# Patient Record
Sex: FEMALE | Race: WHITE | NOT HISPANIC OR LATINO | Employment: OTHER | URBAN - METROPOLITAN AREA
[De-identification: names, ages, dates, MRNs, and addresses within clinical notes are randomized per-mention and may not be internally consistent; named-entity substitution may affect disease eponyms.]

---

## 2017-05-18 ENCOUNTER — GENERIC CONVERSION - ENCOUNTER (OUTPATIENT)
Dept: OTHER | Facility: OTHER | Age: 67
End: 2017-05-18

## 2017-05-18 ENCOUNTER — ALLSCRIPTS OFFICE VISIT (OUTPATIENT)
Dept: OTHER | Facility: OTHER | Age: 67
End: 2017-05-18

## 2017-08-21 ENCOUNTER — ALLSCRIPTS OFFICE VISIT (OUTPATIENT)
Dept: OTHER | Facility: OTHER | Age: 67
End: 2017-08-21

## 2017-08-21 DIAGNOSIS — Z79.899 OTHER LONG TERM (CURRENT) DRUG THERAPY: ICD-10-CM

## 2017-08-21 DIAGNOSIS — R73.09 OTHER ABNORMAL GLUCOSE: ICD-10-CM

## 2017-08-21 DIAGNOSIS — Z78.0 ASYMPTOMATIC MENOPAUSAL STATE: ICD-10-CM

## 2017-08-21 DIAGNOSIS — K21.9 GASTRO-ESOPHAGEAL REFLUX DISEASE WITHOUT ESOPHAGITIS: ICD-10-CM

## 2017-08-21 DIAGNOSIS — Z00.00 ENCOUNTER FOR GENERAL ADULT MEDICAL EXAMINATION WITHOUT ABNORMAL FINDINGS: ICD-10-CM

## 2017-08-31 ENCOUNTER — HOSPITAL ENCOUNTER (OUTPATIENT)
Dept: BONE DENSITY | Facility: MEDICAL CENTER | Age: 67
Discharge: HOME | End: 2017-08-31
Payer: COMMERCIAL

## 2017-08-31 DIAGNOSIS — Z78.0 ASYMPTOMATIC MENOPAUSAL STATE: ICD-10-CM

## 2017-08-31 DIAGNOSIS — R73.09 OTHER ABNORMAL GLUCOSE: ICD-10-CM

## 2017-08-31 PROCEDURE — 77080 DXA BONE DENSITY AXIAL: CPT

## 2017-09-06 ENCOUNTER — APPOINTMENT (OUTPATIENT)
Dept: LAB | Facility: MEDICAL CENTER | Age: 67
End: 2017-09-06
Payer: COMMERCIAL

## 2017-09-06 ENCOUNTER — TRANSCRIBE ORDERS (OUTPATIENT)
Dept: ADMINISTRATIVE | Facility: HOSPITAL | Age: 67
End: 2017-09-06

## 2017-09-06 DIAGNOSIS — Z00.00 ENCOUNTER FOR GENERAL ADULT MEDICAL EXAMINATION WITHOUT ABNORMAL FINDINGS: ICD-10-CM

## 2017-09-06 DIAGNOSIS — Z79.899 OTHER LONG TERM (CURRENT) DRUG THERAPY: ICD-10-CM

## 2017-09-06 DIAGNOSIS — R73.09 OTHER ABNORMAL GLUCOSE: ICD-10-CM

## 2017-09-06 DIAGNOSIS — K21.9 GASTRO-ESOPHAGEAL REFLUX DISEASE WITHOUT ESOPHAGITIS: ICD-10-CM

## 2017-09-06 LAB
25(OH)D3 SERPL-MCNC: 33.7 NG/ML (ref 30–100)
ALBUMIN SERPL BCP-MCNC: 3.6 G/DL (ref 3.5–5)
ALP SERPL-CCNC: 57 U/L (ref 46–116)
ALT SERPL W P-5'-P-CCNC: 19 U/L (ref 12–78)
ANION GAP SERPL CALCULATED.3IONS-SCNC: 7 MMOL/L (ref 4–13)
AST SERPL W P-5'-P-CCNC: 12 U/L (ref 5–45)
BILIRUB SERPL-MCNC: 1.28 MG/DL (ref 0.2–1)
BUN SERPL-MCNC: 17 MG/DL (ref 5–25)
CALCIUM SERPL-MCNC: 8.9 MG/DL (ref 8.3–10.1)
CHLORIDE SERPL-SCNC: 105 MMOL/L (ref 100–108)
CHOLEST SERPL-MCNC: 172 MG/DL (ref 50–200)
CO2 SERPL-SCNC: 27 MMOL/L (ref 21–32)
CREAT SERPL-MCNC: 0.72 MG/DL (ref 0.6–1.3)
EST. AVERAGE GLUCOSE BLD GHB EST-MCNC: 103 MG/DL
GFR SERPL CREATININE-BSD FRML MDRD: 86 ML/MIN/1.73SQ M
GLUCOSE P FAST SERPL-MCNC: 86 MG/DL (ref 65–99)
HBA1C MFR BLD: 5.2 % (ref 4.2–6.3)
HCV AB SER QL: NORMAL
HDLC SERPL-MCNC: 65 MG/DL (ref 40–60)
LDLC SERPL CALC-MCNC: 92 MG/DL (ref 0–100)
MAGNESIUM SERPL-MCNC: 2.1 MG/DL (ref 1.6–2.6)
POTASSIUM SERPL-SCNC: 4.2 MMOL/L (ref 3.5–5.3)
PROT SERPL-MCNC: 7.2 G/DL (ref 6.4–8.2)
SODIUM SERPL-SCNC: 139 MMOL/L (ref 136–145)
TRIGL SERPL-MCNC: 75 MG/DL

## 2017-09-06 PROCEDURE — 83735 ASSAY OF MAGNESIUM: CPT

## 2017-09-06 PROCEDURE — 82306 VITAMIN D 25 HYDROXY: CPT

## 2017-09-06 PROCEDURE — 36415 COLL VENOUS BLD VENIPUNCTURE: CPT

## 2017-09-06 PROCEDURE — 80053 COMPREHEN METABOLIC PANEL: CPT

## 2017-09-06 PROCEDURE — 86803 HEPATITIS C AB TEST: CPT

## 2017-09-06 PROCEDURE — 83036 HEMOGLOBIN GLYCOSYLATED A1C: CPT

## 2017-09-06 PROCEDURE — 80061 LIPID PANEL: CPT

## 2017-09-07 ENCOUNTER — GENERIC CONVERSION - ENCOUNTER (OUTPATIENT)
Dept: OTHER | Facility: OTHER | Age: 67
End: 2017-09-07

## 2017-09-08 ENCOUNTER — GENERIC CONVERSION - ENCOUNTER (OUTPATIENT)
Dept: OTHER | Facility: OTHER | Age: 67
End: 2017-09-08

## 2017-09-11 ENCOUNTER — GENERIC CONVERSION - ENCOUNTER (OUTPATIENT)
Dept: OTHER | Facility: OTHER | Age: 67
End: 2017-09-11

## 2017-09-14 ENCOUNTER — GENERIC CONVERSION - ENCOUNTER (OUTPATIENT)
Dept: OTHER | Facility: OTHER | Age: 67
End: 2017-09-14

## 2017-10-05 ENCOUNTER — GENERIC CONVERSION - ENCOUNTER (OUTPATIENT)
Dept: OTHER | Facility: OTHER | Age: 67
End: 2017-10-05

## 2017-10-07 ENCOUNTER — TRANSCRIBE ORDERS (OUTPATIENT)
Dept: ADMINISTRATIVE | Facility: HOSPITAL | Age: 67
End: 2017-10-07

## 2017-10-07 ENCOUNTER — APPOINTMENT (OUTPATIENT)
Dept: LAB | Facility: MEDICAL CENTER | Age: 67
End: 2017-10-07
Payer: COMMERCIAL

## 2017-10-07 DIAGNOSIS — R17 JAUNDICE: ICD-10-CM

## 2017-10-07 DIAGNOSIS — M15.9 GENERALIZED OSTEOARTHROSIS, INVOLVING MULTIPLE SITES: Primary | ICD-10-CM

## 2017-10-07 DIAGNOSIS — M15.9 GENERALIZED OSTEOARTHROSIS, INVOLVING MULTIPLE SITES: ICD-10-CM

## 2017-10-07 LAB
ALBUMIN SERPL BCP-MCNC: 3.7 G/DL (ref 3.5–5)
ALP SERPL-CCNC: 54 U/L (ref 46–116)
ALT SERPL W P-5'-P-CCNC: 21 U/L (ref 12–78)
AST SERPL W P-5'-P-CCNC: 10 U/L (ref 5–45)
BILIRUB DIRECT SERPL-MCNC: 0.26 MG/DL (ref 0–0.2)
BILIRUB SERPL-MCNC: 1.14 MG/DL (ref 0.2–1)
CALCIUM SERPL-MCNC: 8 MG/DL (ref 8.3–10.1)
PROT SERPL-MCNC: 7.1 G/DL (ref 6.4–8.2)
PTH-INTACT SERPL-MCNC: 50.1 PG/ML (ref 14–72)

## 2017-10-07 PROCEDURE — 82310 ASSAY OF CALCIUM: CPT

## 2017-10-07 PROCEDURE — 80076 HEPATIC FUNCTION PANEL: CPT

## 2017-10-07 PROCEDURE — 36415 COLL VENOUS BLD VENIPUNCTURE: CPT

## 2017-10-07 PROCEDURE — 83970 ASSAY OF PARATHORMONE: CPT

## 2017-10-09 ENCOUNTER — GENERIC CONVERSION - ENCOUNTER (OUTPATIENT)
Dept: OTHER | Facility: OTHER | Age: 67
End: 2017-10-09

## 2017-10-09 ENCOUNTER — ALLSCRIPTS OFFICE VISIT (OUTPATIENT)
Dept: OTHER | Facility: OTHER | Age: 67
End: 2017-10-09

## 2017-10-28 NOTE — PROGRESS NOTES
Assessment    1  Osteoporosis (733 00) (M81 0)    Plan   Osteoporosis    · (1) COMPREHENSIVE METABOLIC PANEL; Status:Active; Requested for:09Apr2018;   Prolia 60 MG/ML Subcutaneous Solution; INJECT SUBCUTANEOUSLY  60 MG / 1 ML EVERY 6 MONTHS; Therapy: [id number] to 23 [id number])  Requested for: [id number]; Last Rx:09Oct2017; Status: ACTIVE Ordered Rx By: Manohar Pelletier; Dispense: 180 Days ; #:1 X 1 ML Syringe; Refill: 1;  For: Osteoporosis; VANNA = N; Verified Transmission to [facility]; Last Updated By: System, Asthmatracker; 10/9/2017 4:23:35 PM   Discussion/Summary    1  Osteoporosis -of the hip and forearm -the patient is DEXA scan results were reviewed  We reviewed treatment options including Prolia and Reclast as recommended per her report  She opted to proceed with Prolia  She declined a referral to Endocrinology  She was encouraged to start a once daily calcium supplement  We reviewed that she is likely getting about 600 mg of calcium with her current very intake and should get an additional 600 mg through supplementation to meet the 1200 mg goal  She is already taking a vitamin-D supplement which she should continue  She was given a slip for CMP to recheck her calcium level we reviewed the importance of maintaining a regular calcium level on the Prolia  We also reviewed the importance of continuing her regular dental exams  She was encouraged to call with any problems or concerns  Otherwise she will follow-up as already scheduled in just under a year  Possible side effects of new medications were reviewed with the patient/guardian today  The treatment plan was reviewed with the patient/guardian  The patient/guardian understands and agrees with the treatment plan      Chief Complaint  Follow up on labs, DEXAwith flu shot      History of Present Illness  The patient presents today to follow-up on her recent DEXA scan results   It showed that she has osteoporosis in both the hip and forearm areas  She has osteopenia in lumbar spine  Her labs showed borderline low calcium level after being corrected for albumin level  She had a normal PTH and vitamin-D level  She notes that she gets usually at least 2 servings of dairy in a day between her cheese and yogurt  She also takes about vitamin-D supplement daily  She is interested in treatment for the osteoporosis  Review of Systems   Constitutional: not feeling poorly  Cardiovascular: no chest pain  Respiratory: no shortness of breath  Active Problems  1  Abnormal glucose level (790 29) (R73 09)   2  Elevated bilirubin (277 4) (R17)   3  Encounter for long-term (current) use of medications (V58 69) (Z79 899)   4  Encounter for screening colonoscopy (V76 51) (Z12 11)   5  Esophageal reflux (530 81) (K21 9)   6  Generalized osteoarthritis (715 00) (M15 9)   7  Influenza vaccine needed (V04 81) (Z23)   8  Joint pain, knee (719 46) (M25 569)   9  Need for tetanus booster (V03 7) (Z23)   10  Postmenopausal (V49 81) (Z78 0)    Past Medical History  1  Encounter for preventive health examination (V70 0) (Z00 00)   2  History of hypertension (V12 59) (Z86 79)   3  History of Tick bite (919 4,E906 4) (W57 XXXA)    Surgical History  1  History of Biopsy Skin   2  History of Excision Of Urethral Polyp(S)    Family History  Mother    1  Family history of Stroke Syndrome (V17 1)  Father    2  Family history of Liver Cancer   3  Family history of Prostate Cancer (V16 42)  Sister    4  Family history of Rheumatoid Arthritis  Brother    5  Family history of Diabetes Mellitus (V18 0)  Paternal Grandmother    10  Family history of malignant neoplasm of stomach (V16 0) (Z80 0)  Maternal Grandfather    7  Family history of lung cancer (V16 1) (Z80 1)  Paternal Grandfather    6  Family history of malignant neoplasm (V16 9) (Z80 9)    Social History     · Denied: History of Never a smoker    Current Meds   1   NexIUM 40 MG Oral Capsule Delayed Release; TAKE 1 CAPSULE DAILY; Therapy: (Tima Santiago) to Recorded   2  Omega-3 CF 1000 MG Oral Capsule; Take 2 tablets by mouth twice daily; Therapy: (Mireille Villalobos) to Recorded   3  Vitamin D3 1000 UNIT Oral Tablet; Therapy: (Recorded:80Sgb6009) to Recorded    Allergies  1  No Known Drug Allergies    Vitals  Vital Signs    Recorded: 01RTT9499 10:17AM   Heart Rate 76   Respiration 18   Systolic 701   Diastolic 70   Height 5 ft 2 3 in   Weight 150 lb    BMI Calculated 27 17   BSA Calculated 1 7       Physical Exam   Constitutional  General appearance: No acute distress, well appearing and well nourished  Neck  Neck: Supple, symmetric, trachea midline, no masses  Thyroid: Normal, no thyromegaly  Pulmonary  Respiratory effort: No increased work of breathing or signs of respiratory distress  Auscultation of lungs: Clear to auscultation  Cardiovascular  Auscultation of heart: Normal rate and rhythm, normal S1 and S2, no murmurs  Peripheral vascular exam: Normal   Carotid: no bruit on the right-- and-- no bruit on the left  Radial: right 2+-- and-- left 2+  Posterior tibialis: right 2+-- and-- left 2+  Examination of extremities for edema and/or varicosities: Normal   no edema    Psychiatric  Mood and affect: Normal        Future Appointments    Date/Time Provider Specialty Site   08/21/2018 10:00 AM Margaux Plummer [facility] Family Medicine [address] FAMILY MEDICINE       Signatures   Electronically signed by : Zafar Lindsey [facility]; Oct 10 2017 12:22AM EST                       (Author)    Electronically signed by : ALVERTO Le ; Oct 10 2017 11:46AM EST

## 2017-11-14 ENCOUNTER — TRANSCRIBE ORDERS (OUTPATIENT)
Dept: ADMINISTRATIVE | Facility: HOSPITAL | Age: 67
End: 2017-11-14

## 2017-11-14 DIAGNOSIS — Z12.39 SCREENING BREAST EXAMINATION: Primary | ICD-10-CM

## 2018-01-10 NOTE — RESULT NOTES
Verified Results  (1) COMPREHENSIVE METABOLIC PANEL [id number] 13:95SA [employer]    Order Number: [id number]     Test Name Result Flag Reference   SODIUM 139 mmol/L  136-145   POTASSIUM 4 2 mmol/L  3 5-5 3   CHLORIDE 105 mmol/L  100-108   CARBON DIOXIDE 27 mmol/L  21-32   ANION GAP (CALC) 7 mmol/L  4-13   BLOOD UREA NITROGEN 17 mg/dL  5-25   CREATININE 0 72 mg/dL  0 60-1 30   Standardized to IDMS reference method   CALCIUM 8 9 mg/dL  8 3-10 1   BILI, TOTAL 1 28 mg/dL H 0 20-1 00   ALK PHOSPHATAS 57 U/L     ALT (SGPT) 19 U/L  12-78   Specimen collection should occur prior to Sulfasalazine and/or Sulfapyridine administration due to the potential for falsely depressed results  AST(SGOT) 12 U/L  5-45   Specimen collection should occur prior to Sulfasalazine administration due to the potential for falsely depressed results  ALBUMIN 3 6 g/dL  3 5-5 0   TOTAL PROTEIN 7 2 g/dL  6 4-8 2   eGFR 86 ml/min/1 73sq m     National Kidney Disease Education Program recommendations are as follows:  GFR calculation is accurate only with a steady state creatinine  Chronic Kidney disease less than 60 ml/min/1 73 sq  meters  Kidney failure less than 15 ml/min/1 73 sq  meters  GLUCOSE FASTING 86 mg/dL  65-99   Specimen collection should occur prior to Sulfasalazine administration due to the potential for falsely depressed results  Specimen collection should occur prior to Sulfapyridine administration due to the potential for falsely elevated results  (1) HEMOGLOBIN A1C [id number] 09:29AM [employer]    Order Number: [id number]     Test Name Result Flag Reference   HEMOGLOBIN A1C 5 2 %  4 2-6 3   EST  AVG   GLUCOSE 103 mg/dl       (1) LIPID PANEL FASTING W DIRECT LDL REFLEX [id number] 09:29AM [employer]    Order Number: [id number]     Test Name Result Flag Reference   CHOLESTEROL 172 mg/dL     LDL CHOLESTEROL CALCULATED 92 mg/dL  0-100   Triglyceride:        Normal ??? ??? ??? ??? ??? ??? ??? <150 mg/dl   ??? ??? ???Borderline High ??? ??? 150-199 mg/dl   ??? ??? ? ?? High ??? ??? ??? ??? ??? ??? ??? 200-499 mg/dl   ??? ??? ? ??Very High ??? ??? ??? ??? ??? >499 mg/dl      Cholesterol:       Desirable ??? ??? ??? ??? <200 mg/dl   ??? ??? Borderline High ??? 200-239 mg/dl   ??? ??? High ??? ??? ??? ??? ??? ??? >239 mg/dl      HDL Cholesterol:       High ??? ???>59 mg/dL   ??? ??? Low ??? ??? <41 mg/dL      HDL Cholesterol:       High ??? ???>59 mg/dL   ??? ??? Low ??? ??? <41 mg/dL      This screening LDL is a calculated result  It does not have the accuracy of the Direct Measured LDL in the monitoring of patients with hyperlipidemia and/or statin therapy  Direct Measure LDL (CKM175) must be ordered separately in these patients  TRIGLYCERIDES 75 mg/dL  <=150   Specimen collection should occur prior to N-Acetylcysteine or Metamizole administration due to the potential for falsely depressed results  HDL,DIRECT 65 mg/dL H 40-60   Specimen collection should occur prior to Metamizole administration due to the potential for falsley depressed results  (1) MAGNESIUM [id number] 09:29AM Walter Clear   TW Order Number: [id number]     Test Name Result Flag Reference   MAGNESIUM 2 1 mg/dL  1 6-2 6     (1) HEP C ANTIBODY [id number] 09:29AM Walter Clear   TW Order Number: [id number]     Test Name Result Flag Reference   HEPATITIS C ANTIBODY Non-reactive  Non-reactive     (1) VITAMIN D 25-HYDROXY [id number] 09:29AM Walter Clear   TW Order Number: [id number]     Test Name Result Flag Reference   VIT D 25-HYDROX 33 7 ng/mL  30 0-100 0   This assay is a certified procedure of the CDC Vitamin D Standardization Certification Program (VDSCP)     Deficiency <20ng/ml   Insufficiency 20-30ng/ml   Sufficient  ng/ml     *Patients undergoing fluorescein dye angiography may retain small amounts of fluorescein in the body for 48-72 hours post procedure   Samples containing fluorescein can produce falsely elevated Vitamin D values  If the patient had this procedure, a specimen should be resubmitted post fluorescein clearance

## 2018-01-10 NOTE — RESULT NOTES
Verified Results  (1) CALCIUM [id number] 03:12PM Jeni Leggett     Test Name Result Flag Reference   CALCIUM 8 0 mg/dL L 8 3-10 1     (1) PTH N-TERMINAL (INTACT) [id number] 03:12PM Jeni Leggett     Test Name Result Flag Reference   PARATHYROID HORMONE INTACT 50 1 pg/mL  14 0-72 0     (1) HEPATIC FUNCTION PANEL [id number] 08:22AM Jeni Leggett   TW Order Number: [id number]     Test Name Result Flag Reference   ALBUMIN 3 7 g/dL  3 5-5 0   ALK PHOSPHATAS 54 U/L     ALT (SGPT) 21 U/L  12-78   Specimen collection should occur prior to Sulfasalazine and/or Sulfapyridine administration due to the potential for falsely depressed results  AST(SGOT) 10 U/L  5-45   Specimen collection should occur prior to Sulfasalazine and/or Sulfapyridine administration due to the potential for falsely depressed results     BILI, DIRECT 0 26 mg/dL H 0 00-0 20   BILI, TOTAL 1 14 mg/dL H 0 20-1 00   TOTAL PROTEIN 7 1 g/dL  6 4-8 2

## 2018-01-11 NOTE — PROGRESS NOTES
Assessment    1  Encounter for preventive health examination (V70 0) (Z00 00)   2  Esophageal reflux (530 81) (K21 9)   3  Abnormal glucose level (790 29) (R73 09)   4  Need for pneumococcal vaccination (V03 82) (Z23)    Plan  Abnormal glucose level    · (1) LIPID PANEL FASTING W DIRECT LDL REFLEX; Status:Active; Requested  for:59Aug2666; Abnormal glucose level, Health Maintenance    · (1) HEMOGLOBIN A1C; Status:Active; Requested for:21Aug2017; Abnormal glucose level, Postmenopausal    · * DXA BONE DENSITY SPINE HIP AND PELVIS; Status:Hold For - Scheduling;  Requested for:21Aug2017;   Encounter for long-term (current) use of medications, Health Maintenance    · (1) VITAMIN D 25-HYDROXY; Status:Active; Requested for:21Aug2017; Health Maintenance    · (1) COMPREHENSIVE METABOLIC PANEL; Status:Active; Requested for:21Aug2017;    · (1) HEP C ANTIBODY; Status:Active; Requested for:21Aug2017; Health Maintenance, Esophageal reflux    · (1) MAGNESIUM; Status:Active; Requested for:21Aug2017; Influenza vaccine needed    · Fluzone High-Dose 0 5 ML Intramuscular Suspension Prefilled Syringe  Need for pneumococcal vaccination    · Prevnar 13 Intramuscular Suspension    Discussion/Summary  health maintenance visit healthy adult female Currently, she eats a healthy diet and has an adequate exercise regimen  cervical cancer screening is current Breast cancer screening: mammogram is current  Colorectal cancer screening: colorectal cancer screening is current and the next colonoscopy is due 2023  Osteoporosis screening: bone mineral density testing has been ordered  Screening lab work includes labs ordered as above  The immunizations will be given as outlined in the orders  Patient discussion: discussed with the patient  Hepatitis C Screening: the patient was counseled on Hepatitis C screening  The patient agrees to Hepatitis C screening  GERD - well-controlled - we discussed possible side effects of PPIs   She'll continue with the Nexium 40 mg daily for now, but plan to discuss possible decrease in dose when she returns to follow-up with GI  We will check a magnesium level with labs as above  Abnormal fasting blood sugar - we reviewed that her last blood work from 2014 showed a minimally elevated blood sugar 101 - this will be reassessed with labs as above  Chief Complaint  Physical       History of Present Illness  HM, Adult Female: The patient is being seen for a health maintenance evaluation  General Health: The patient's health since the last visit is described as good   has arthritis in knee and back but helped with glycomarine  She has regular dental visits  She complains of vision problems  Vision care includes wearing reading glasses and having eye examinations 1 times per year  She denies hearing loss  Lifestyle:  She consumes a diverse and healthy diet  (eating more whole foods - drinks mostly seltzer water - occ flavored tea - eats out only once a week)   She has weight concerns  (has lost 25 pounds since retired 4 years ago)   She exercises regularly  She exercises 5 times per week for 20-25 minutes per session  Exercise includes walking  She does not use tobacco  She denies alcohol use  She denies drug use  Reproductive health: the patient is postmenopausal    Screening: Cervical cancer screening includes goes yearly but last Pap was probably 12/15  Breast cancer screening includes a mammogram performed last year  Colorectal cancer screening includes a colonoscopy performed 2013  Metabolic screening includes lipid profile performed 2014, glucose screening performed 2014 and no previous DEXA  Safety elements used: seat belt, sunscreen, smoke detector and carbon monoxide detector  Risk findings: no domestic violence  HPI: The patient takes Nexium on a daily basis for her acid reflux  She does follow with GI   She'll be seeing them next spring she notes that it works well to control her symptoms  There is a time a few years ago when she needed to try some other options due to insurance coverage but they did not work  She did well once she was placed back on the Nexium  Review of Systems    Constitutional: no fever and no chills  Cardiovascular: no chest pain and no palpitations  Respiratory: no shortness of breath, no cough and no wheezing  Gastrointestinal: no nausea, no vomiting, no diarrhea and no blood in stools  Genitourinary: no dysuria  Musculoskeletal: no myalgias    The patient presents with complaints of arthralgias (knees and back)  Integumentary: no rashes and no skin lesions  Neurological: no headache, no dizziness and no fainting  Psychiatric: no anxiety and no depression  Hematologic/Lymphatic: no tendency for easy bleeding and no tendency for easy bruising  Active Problems    1  Encounter for screening colonoscopy (V76 51) (Z12 11)   2  Esophageal reflux (530 81) (K21 9)   3  Generalized osteoarthritis (715 00) (M15 9)   4  Influenza vaccine needed (V04 81) (Z23)   5  Joint pain, knee (719 46) (M25 569)   6  Need for tetanus booster (V03 7) (Z23)    Past Medical History    · History of hypertension (V12 59) (Z86 79)   · History of Tick bite (919 4,E906 4) (W57 XXXA)    Surgical History    · History of Biopsy Skin   · History of Excision Of Urethral Polyp(S)    Family History  Mother    · Family history of Stroke Syndrome (V17 1)  Father    · Family history of Liver Cancer   · Family history of Prostate Cancer (V16 42)  Sister    · Family history of Rheumatoid Arthritis  Brother    · Family history of Diabetes Mellitus (V18 0)  Paternal Grandmother    · Family history of malignant neoplasm of stomach (V16 0) (Z80 0)  Maternal Grandfather    · Family history of lung cancer (V16 1) (Z80 1)  Paternal Grandfather    · Family history of malignant neoplasm (V16 9) (Z80 9)    Social History    · Denied: History of Never a smoker    Current Meds   1   NexIUM 40 MG Oral Capsule Delayed Release; TAKE 1 CAPSULE DAILY; Therapy: (Shaun Sue) to Recorded   2  Omega-3 CF 1000 MG Oral Capsule; Take 2 tablets by mouth twice daily; Therapy: (Arvella Solsamina) to Recorded   3  Vitamin D3 1000 UNIT Oral Tablet; Therapy: (Recorded:21Aug2017) to Recorded    Allergies    1  No Known Drug Allergies    Vitals   Recorded: 21Aug2017 11:02AM   Heart Rate 78   Systolic 126   Diastolic 70   Height 5 ft 2 3 in   Weight 147 lb 6 oz   BMI Calculated 26 7   BSA Calculated 1 68     Physical Exam    Constitutional   General appearance: No acute distress, well appearing and well nourished  Head and Face   Head and face: Normal     Eyes   Conjunctiva and lids: No swelling, erythema or discharge  Pupils and irises: Equal, round, reactive to light  Ears, Nose, Mouth, and Throat   External inspection of ears and nose: Normal     Otoscopic examination: Tympanic membranes translucent with normal light reflex  Canals patent without erythema  Hearing: Normal     Nasal mucosa, septum, and turbinates: Normal without edema or erythema  Lips, teeth, and gums: Normal, good dentition  Oropharynx: Normal with no erythema, edema, exudate or lesions  Neck   Neck: Supple, symmetric, trachea midline, no masses  Thyroid: Normal, no thyromegaly  Pulmonary   Respiratory effort: No increased work of breathing or signs of respiratory distress  Auscultation of lungs: Clear to auscultation  Cardiovascular   Auscultation of heart: Normal rate and rhythm, normal S1 and S2, no murmurs  Peripheral vascular exam: Normal   Carotid: no bruit on the right and no bruit on the left  Radial: right 2+ and left 2+  Posterior tibialis: right 2+ and left 2+  Examination of extremities for edema and/or varicosities: Normal   no edema  Abdomen   Abdomen: Non-tender, no masses  Liver and spleen: No hepatomegaly or splenomegaly     Lymphatic   Palpation of lymph nodes in neck: No lymphadenopathy  Musculoskeletal   Gait and station: Normal     Muscle strength/tone: Normal   Motor Strength Findings: normal upper extremity strength and normal lower extremity strength  Skin   Skin and subcutaneous tissue: Normal without rashes or lesions  Examination of the skin for lesions: Abnormal   Small pink macules over the right flank where she had a tick bite previously, no erythema/warmth/swelling/fluctuance noted  Neurologic   Sensation: No sensory loss  Sensory exam: intact to light touch  Psychiatric   Mood and affect: Normal        Future Appointments    Date/Time Provider Specialty Site   08/21/2018 10:00 AM Danuta Jacome [facility] Family Medicine [address] [facility] MEDICINE     Signatures   Electronically signed by : Denis Bob [facility];  Aug 21 2017  1:00PM EST                       (Author)    Electronically signed by : ALVERTO Thurston ; Aug 22 2017  9:48AM EST

## 2018-01-12 NOTE — RESULT NOTES
Verified Results  DXA FOLLOW UP (NO CHARGE) [id number] 09:37AM Savi Gauthier Order Number: [id number]    - Patient Instructions: To schedule this appointment, please contact Central Scheduling at [phone]  Test Name Result Flag Reference   DXA FOLLOW UP (NO CHARGE) (Report)     CENTRAL DXA SCAN     CLINICAL HISTORY:  75year old post-menopausal  female risk factors include estrogen deficiency  TECHNIQUE: Bone densitometry was performed using a Ajaline's W bone densitometer  Regions of interest appear properly placed  There are no obvious fractures or other confounding variables which could limit the study  Degenerative and    osteoarthritic and scoliotic changes are noted on the spine image basically eliminating the validity of the spine density result  COMPARISON: Baseline     RESULTS:    LUMBAR SPINE: L1-L2:   BMD 0 868 gm/cm2   T-score below normal, -1 0   Z-score 0 9     LEFT TOTAL HIP:   BMD 0 634 gm/cm2   T-score below normal, -2 5, osteoporosis  Z-score -1 1     LEFT FEMORAL NECK:   BMD 0 541 gm/cm2   T-score below normal, -2 8, osteoporosis  Z-score -1 1     The forearm BMD is 0 537 and the T score is below normal, -2 6, osteoporosis  The Z score is -0 7  A 25-hydroxy vitamin D level, an intact PTH, and a comprehensive metabolic panel are suggested in view of the study results  Treatment is a consideration and might include Prolia or intravenous Reclast        IMPRESSION:   1  Based on the [facility] classification, this study identifies a diagnosis of osteoporosis, notable at the forearm and femoral neck areas and the patient is considered at elevated risk for fracture  2  A daily intake of calcium of at least 1200 mg and vitamin D, 800-1000 IU, as well as weight bearing and muscle strengthening exercise, fall prevention and avoidance of tobacco and excessive alcohol intake as basic preventive measures are recommended       3  Repeat DXA scan on the same equipment in 18-24 months as clinically indicated  The 10 year risk of hip fracture is 3 7%, with the 10 year risk of major osteoporotic fracture being 15%, as calculated by the [facility] fracture risk assessment tool (FRAX)  The current NOF guidelines recommend treating patients with FRAX 10 year risk score    of >3% for hip fracture and >20% for major osteoporotic fracture  Hip fracture risk exceeds treatment thresholds  WHO CLASSIFICATION:   Normal (a T-score of -1 0 or higher)   Low bone mineral density (a T-score of less than -1 0 but higher than -2 5)   Osteoporosis (a T-score of -2 5 or less)   Severe osteoporosis (a T-score of -2 5 or less with a fragility fracture)      Thank you for allowing us the opportunity to participate in your patient care  The expanded DEXA report will no longer be arriving in your mail  If you desire to view the full report please contact [address] [place] [address] or access the PACS system         Workstation performed: [id number]     Signed by:   Mya Coleman MD   9/11/17

## 2018-01-12 NOTE — RESULT NOTES
Verified Results  DXA FOLLOW UP (NO CHARGE) [id number] 09:37AM Kevin Stapleton Order Number: [id number]    - Patient Instructions: To schedule this appointment, please contact Central Scheduling at [phone]  Test Name Result Flag Reference   DXA FOLLOW UP (NO CHARGE) (Report)     CENTRAL DXA SCAN     CLINICAL HISTORY:  75year old post-menopausal  female risk factors include estrogen deficiency  TECHNIQUE: Bone densitometry was performed using a [employer] W bone densitometer  Regions of interest appear properly placed  There are no obvious fractures or other confounding variables which could limit the study  Degenerative and    osteoarthritic and scoliotic changes are noted on the spine image basically eliminating the validity of the spine density result  COMPARISON: Baseline     RESULTS:    LUMBAR SPINE: L1-L2:   BMD 0 868 gm/cm2   T-score below normal, -1 0   Z-score 0 9     LEFT TOTAL HIP:   BMD 0 634 gm/cm2   T-score below normal, -2 5, osteoporosis  Z-score -1 1     LEFT FEMORAL NECK:   BMD 0 541 gm/cm2   T-score below normal, -2 8, osteoporosis  Z-score -1 1     The forearm BMD is 0 537 and the T score is below normal, -2 6, osteoporosis  The Z score is -0 7  A 25-hydroxy vitamin D level, an intact PTH, and a comprehensive metabolic panel are suggested in view of the study results  Treatment is a consideration and might include Prolia or intravenous Reclast        IMPRESSION:   1  Based on the [facility] classification, this study identifies a diagnosis of osteoporosis, notable at the forearm and femoral neck areas and the patient is considered at elevated risk for fracture  2  A daily intake of calcium of at least 1200 mg and vitamin D, 800-1000 IU, as well as weight bearing and muscle strengthening exercise, fall prevention and avoidance of tobacco and excessive alcohol intake as basic preventive measures are recommended       3  Repeat DXA scan on the same equipment in 18-24 months as clinically indicated  The 10 year risk of hip fracture is 3 7%, with the 10 year risk of major osteoporotic fracture being 15%, as calculated by the [facility] fracture risk assessment tool (FRAX)  The current NOF guidelines recommend treating patients with FRAX 10 year risk score    of >3% for hip fracture and >20% for major osteoporotic fracture  Hip fracture risk exceeds treatment thresholds  WHO CLASSIFICATION:   Normal (a T-score of -1 0 or higher)   Low bone mineral density (a T-score of less than -1 0 but higher than -2 5)   Osteoporosis (a T-score of -2 5 or less)   Severe osteoporosis (a T-score of -2 5 or less with a fragility fracture)      Thank you for allowing us the opportunity to participate in your patient care  The expanded DEXA report will no longer be arriving in your mail  If you desire to view the full report please contact [address] [place] [address] or access the PACS system         Workstation performed: [id number]     Signed by:   Fiorella Davalos MD   9/11/17

## 2018-01-13 VITALS
OXYGEN SATURATION: 98 % | HEIGHT: 62 IN | BODY MASS INDEX: 28.06 KG/M2 | SYSTOLIC BLOOD PRESSURE: 110 MMHG | HEART RATE: 74 BPM | RESPIRATION RATE: 18 BRPM | WEIGHT: 152.5 LBS | DIASTOLIC BLOOD PRESSURE: 70 MMHG

## 2018-01-13 VITALS
BODY MASS INDEX: 27.6 KG/M2 | WEIGHT: 150 LBS | SYSTOLIC BLOOD PRESSURE: 108 MMHG | HEART RATE: 76 BPM | HEIGHT: 62 IN | DIASTOLIC BLOOD PRESSURE: 70 MMHG | RESPIRATION RATE: 18 BRPM

## 2018-01-13 VITALS
HEIGHT: 62 IN | HEART RATE: 78 BPM | SYSTOLIC BLOOD PRESSURE: 116 MMHG | DIASTOLIC BLOOD PRESSURE: 70 MMHG | BODY MASS INDEX: 27.12 KG/M2 | WEIGHT: 147.38 LBS

## 2018-01-16 ENCOUNTER — GENERIC CONVERSION - ENCOUNTER (OUTPATIENT)
Dept: OTHER | Facility: OTHER | Age: 68
End: 2018-01-16

## 2018-01-16 ENCOUNTER — HOSPITAL ENCOUNTER (OUTPATIENT)
Dept: MAMMOGRAPHY | Facility: MEDICAL CENTER | Age: 68
Discharge: HOME | End: 2018-01-16
Payer: COMMERCIAL

## 2018-01-16 DIAGNOSIS — Z12.39 SCREENING BREAST EXAMINATION: ICD-10-CM

## 2018-01-16 PROCEDURE — 77067 SCR MAMMO BI INCL CAD: CPT

## 2018-01-18 NOTE — RESULT NOTES
Verified Results  * DXA BONE DENSITY SPINE HIP AND PELVIS [id number] 09:10AM Garo Bob    Order Number: [id number]    - Patient Instructions: To schedule this appointment, please contact Central Scheduling at [phone]  Test Name Result Flag Reference   DXA BONE DENSITY SPINE HIP AND PELVIS (Report)     CENTRAL DXA SCAN     CLINICAL HISTORY:  75year old post-menopausal  female risk factors include estrogen deficiency  TECHNIQUE: Bone densitometry was performed using a [employer] W bone densitometer  Regions of interest appear properly placed  There are no obvious fractures or other confounding variables which could limit the study  Degenerative and    osteoarthritic and scoliotic changes are noted on the spine image basically eliminating the validity of the spine density result  COMPARISON: Baseline     RESULTS:    LUMBAR SPINE: L1-L2:   BMD 0 868 gm/cm2   T-score below normal, -1 0   Z-score 0 9     LEFT TOTAL HIP:   BMD 0 634 gm/cm2   T-score below normal, -2 5, osteoporosis  Z-score -1 1     LEFT FEMORAL NECK:   BMD 0 541 gm/cm2   T-score below normal, -2 8, osteoporosis  Z-score -1 1     The forearm BMD is 0 537 and the T score is below normal, -2 6, osteoporosis  The Z score is -0 7  A 25-hydroxy vitamin D level, an intact PTH, and a comprehensive metabolic panel are suggested in view of the study results  Treatment is a consideration and might include Prolia or intravenous Reclast        IMPRESSION:   1  Based on the [facility] classification, this study identifies a diagnosis of osteoporosis, notable at the forearm and femoral neck areas and the patient is considered at elevated risk for fracture  2  A daily intake of calcium of at least 1200 mg and vitamin D, 800-1000 IU, as well as weight bearing and muscle strengthening exercise, fall prevention and avoidance of tobacco and excessive alcohol intake as basic preventive measures are recommended       3  Repeat DXA scan on the same equipment in 18-24 months as clinically indicated  The 10 year risk of hip fracture is 3 7%, with the 10 year risk of major osteoporotic fracture being 15%, as calculated by the [facility] fracture risk assessment tool (FRAX)  The current NOF guidelines recommend treating patients with FRAX 10 year risk score    of >3% for hip fracture and >20% for major osteoporotic fracture  Hip fracture risk exceeds treatment thresholds  WHO CLASSIFICATION:   Normal (a T-score of -1 0 or higher)   Low bone mineral density (a T-score of less than -1 0 but higher than -2 5)   Osteoporosis (a T-score of -2 5 or less)   Severe osteoporosis (a T-score of -2 5 or less with a fragility fracture)      Thank you for allowing us the opportunity to participate in your patient care  The expanded DEXA report will no longer be arriving in your mail  If you desire to view the full report please contact [address] [place] [address] or access the PACS system         Workstation performed: [id number]     Signed by:   Tiffany Don MD   9/8/17

## 2018-04-09 DIAGNOSIS — M81.0 AGE-RELATED OSTEOPOROSIS WITHOUT CURRENT PATHOLOGICAL FRACTURE: ICD-10-CM

## 2018-04-13 ENCOUNTER — APPOINTMENT (OUTPATIENT)
Dept: LAB | Facility: MEDICAL CENTER | Age: 68
End: 2018-04-13
Payer: COMMERCIAL

## 2018-04-13 ENCOUNTER — TRANSCRIBE ORDERS (OUTPATIENT)
Dept: ADMINISTRATIVE | Facility: HOSPITAL | Age: 68
End: 2018-04-13

## 2018-04-13 DIAGNOSIS — M81.0 AGE-RELATED OSTEOPOROSIS WITHOUT CURRENT PATHOLOGICAL FRACTURE: ICD-10-CM

## 2018-04-13 LAB
ALBUMIN SERPL BCP-MCNC: 4 G/DL (ref 3.5–5)
ALP SERPL-CCNC: 40 U/L (ref 46–116)
ALT SERPL W P-5'-P-CCNC: 22 U/L (ref 12–78)
ANION GAP SERPL CALCULATED.3IONS-SCNC: 4 MMOL/L (ref 4–13)
AST SERPL W P-5'-P-CCNC: 14 U/L (ref 5–45)
BILIRUB SERPL-MCNC: 1.21 MG/DL (ref 0.2–1)
BUN SERPL-MCNC: 18 MG/DL (ref 5–25)
CALCIUM SERPL-MCNC: 9.3 MG/DL
CHLORIDE SERPL-SCNC: 105 MMOL/L (ref 100–108)
CO2 SERPL-SCNC: 28 MMOL/L (ref 21–32)
CREAT SERPL-MCNC: 0.72 MG/DL (ref 0.6–1.3)
GFR SERPL CREATININE-BSD FRML MDRD: 86 ML/MIN/1.73SQ M
GLUCOSE SERPL-MCNC: 93 MG/DL (ref 65–140)
POTASSIUM SERPL-SCNC: 4 MMOL/L (ref 3.5–5.3)
PROT SERPL-MCNC: 7.6 G/DL (ref 6.4–8.2)
SODIUM SERPL-SCNC: 137 MMOL/L (ref 136–145)

## 2018-04-13 PROCEDURE — 80053 COMPREHEN METABOLIC PANEL: CPT

## 2018-04-13 PROCEDURE — 36415 COLL VENOUS BLD VENIPUNCTURE: CPT

## 2018-04-16 DIAGNOSIS — M19.90 OSTEOARTHRITIS, UNSPECIFIED OSTEOARTHRITIS TYPE, UNSPECIFIED SITE: Primary | ICD-10-CM

## 2018-04-20 ENCOUNTER — CLINICAL SUPPORT (OUTPATIENT)
Dept: FAMILY MEDICINE CLINIC | Facility: CLINIC | Age: 68
End: 2018-04-20
Payer: COMMERCIAL

## 2018-04-20 DIAGNOSIS — M19.90 OSTEOARTHRITIS, UNSPECIFIED OSTEOARTHRITIS TYPE, UNSPECIFIED SITE: ICD-10-CM

## 2018-04-20 PROBLEM — M81.0 OSTEOPOROSIS: Status: ACTIVE | Noted: 2017-10-09

## 2018-04-20 PROBLEM — R17 ELEVATED BILIRUBIN: Status: ACTIVE | Noted: 2017-09-07

## 2018-04-20 PROBLEM — R73.09 ABNORMAL GLUCOSE LEVEL: Status: ACTIVE | Noted: 2017-08-21

## 2018-04-20 PROCEDURE — 96372 THER/PROPH/DIAG INJ SC/IM: CPT

## 2018-08-25 NOTE — PROGRESS NOTES
[facility]  FAMILY PRACTICE OFFICE VISIT       NAME: Tiburcio Pinon  AGE: 70 y o  SEX: female       : 1950        MRN: [id number]    DATE: 2018  TIME: 7:45 PM    Assessment and Plan     Problem List Items Addressed This Visit     Osteoporosis      She will continue her Prolia injections every 6 months  She will be due for her DEXA scan in a year  She was provided a lab to recheck her electrolytes including her calcium prior to next injection  She should continue with her and vitamin-D supplementation as well  Relevant Medications    denosumab (PROLIA) 60 mg/mL    Other Relevant Orders    Comprehensive metabolic panel    Generalized osteoarthritis      Will continue to follow with Orthopedics as directed  Can continue ibuprofen as needed for pain relief         Esophageal reflux      Stable  She will continue with Nexium 40 milligrams daily  She notes that she has decreasing her her gastroenterologist but was discouraged doing she and said for knee pain  Relevant Medications    esomeprazole (NEXIUM) 40 MG capsule      Other Visit Diagnoses     Well adult exam    -  Primary    Need for shingles vaccine        Relevant Medications    Zoster Vac Recomb Adjuvanted ([address]) 50 MCG SUSR    Need for pneumococcal vaccination        Relevant Orders    PNEUMOCOCCAL POLYSACCHARIDE VACCINE 23-VALENT =>1YO SQ IM (Completed)          Esophageal reflux   Stable  She will continue with Nexium 40 milligrams daily  She notes that she has decreasing her her gastroenterologist but was discouraged doing she and said for knee pain  Generalized osteoarthritis   Will continue to follow with Orthopedics as directed  Can continue ibuprofen as needed for pain relief    Osteoporosis   She will continue her Prolia injections every 6 months  She will be due for her DEXA scan in a year    She was provided a lab to recheck her electrolytes including her calcium prior to next injection  She should continue with her and vitamin-D supplementation as well  The patient presented today for health maintenance visit  She currently eats a well-balanced  diet  She has a sporadic  exercise regimen  She was encouraged to improve exercise with a goal of at least 150 minutes of exercise weekly  For breast cancer screening, mammogram is up-to-date  For colorectal cancer screening, colonoscopy is up-to-date and next colonoscopy is due in 2023  For osteoporosis screening, DEXA scan is up-to-date  Screening labs were ordered  Hepatitis C screening was discussed and reviewed  Immunizations are not up-to-date, Shingrix script provided to patient today, Pneumovax given today and will get flu shot when returns for Prolia in October  Advice and education were given regarding exercise  Chief Complaint     Chief Complaint   Patient presents with    Physical Exam       History of Present Illness   Meche Hannah is a 70y o -year-old female who presents for health maintenance visit  , Adult Female: The patient is being seen for health maintenance evaluation  General Health: The patient's health since the last visit is described as good  She has had regular dental visits  She denies vision problems - goes to ophthalmology yearly and has minimal cataracts  She denies hearing loss  Immunizations are not up-to-date, Shingrix is due, Pneumovax is due and influenza vaccine is due  Lifestyle:  She describes her diet as a well-balanced and high in berries and fruits (red meat only once a week - has turkey, chicken and fish)  She reports a sporadic exercise routine - when the weather cools off she will walk more  She denies tobacco use  She denies alcohol use  She denies drug use  Reproductive Health: The patient is post-menopausal and denies postmenopausal bleeding      Screening:  Her most recent cervical cancer screening was a few years ago but goes yearly for exam   Her last breast cancer screening was a mammogram on 1/16/18  She does regular self breast exams  Colorectal cancer screening was 8/2013 - good for 10 years  Metabolic screening includes lipid panel done 2017, glucose screening done 2018, thyroid function test done, and DEXA performed 8/31/17  Safety Screening:  She confirms wearing seatbelts, confirms having smoke detectors, and confirms wearing sunscreen  She denies domestic violence  Patient on Prolia for osteoporosis - and still doing them here every 6 months - scheduled for October     Notes that she has muscle spasms about once a month in legs night is of any triggering cause    Sees podiatry for a bunion in the right notes that she what she believes heel spur in the back her right - treating open back shoes but will be returning to Podiatry for further assistance with    Notes that she saw Dr Esteban Sabillon for her knee and it lasted about a month when got injection - goes back in October - notes that she has tried OTC products like Advil and [employer] Quinyx AB as well as ZenoLink with some help    The patient reports that GERD has been well-controlled with Nexium 40 mg daily  Review of Systems   Review of Systems   Constitutional: Negative for chills and fever  HENT: Negative for rhinorrhea and sore throat  Eyes: Negative for visual disturbance  Respiratory: Negative for cough, shortness of breath and wheezing  Cardiovascular: Negative for chest pain, palpitations and leg swelling  Gastrointestinal: Negative for abdominal pain, constipation, diarrhea, nausea and vomiting  Endocrine: Negative for polydipsia and polyuria  Genitourinary: Negative for dysuria  Musculoskeletal: Positive for arthralgias (left knee and right heel)  Negative for myalgias  Skin: Negative for rash  Neurological: Negative for dizziness, syncope and headaches  Hematological: Does not bruise/bleed easily  Psychiatric/Behavioral: Negative for dysphoric mood  The patient is not nervous/anxious  Active Problem List     Patient Active Problem List   Diagnosis    Osteoporosis    Joint pain, knee    Generalized osteoarthritis    Esophageal reflux    Elevated bilirubin    Midline cystocele         Past Medical History:  No past medical history on file  Past Surgical History:  Past Surgical History:   Procedure Laterality Date    PERIURETHRAL ABSCESS DRAINAGE  1979       Family History:  Family History   Problem Relation Age of Onset    Stroke Mother     Prostate cancer Father     Rheum arthritis Sister     Diabetes Brother     No Known Problems Maternal Grandmother     Lung cancer Maternal Grandfather     Stomach cancer Paternal Grandmother     Prostate cancer Paternal Grandfather        Social History:  Social History     Social History    Marital status: /Civil Union     Spouse name: N/A    Number of children: N/A    Years of education: N/A     Occupational History    Not on file  Social History Main Topics    Smoking status: Never Smoker    Smokeless tobacco: Never Used    Alcohol use No    Drug use: No    Sexual activity: Not on file     Other Topics Concern    Not on file     Social History Narrative    No narrative on file       Objective     Vitals:    08/28/18 1006   BP: 100/72   BP Location: Left arm   Patient Position: Sitting   Cuff Size: Standard   Pulse: 72   SpO2: 97%   Weight: 68 3 kg (150 lb 8 oz)   Height: 5' 2 1" (1 577 m)     Wt Readings from Last 3 Encounters:   08/28/18 68 3 kg (150 lb 8 oz)   10/09/17 68 kg (150 lb)   08/21/17 66 9 kg (147 lb 6 1 oz)       Physical Exam   Constitutional: She appears well-developed and well-nourished  HENT:   Head: Normocephalic and atraumatic     Right Ear: Hearing, tympanic membrane, external ear and ear canal normal    Left Ear: Hearing, tympanic membrane, external ear and ear canal normal    Nose: Nose normal  Mouth/Throat: Oropharynx is clear and moist and mucous membranes are normal    Eyes: Conjunctivae and lids are normal  Pupils are equal, round, and reactive to light  Neck: Trachea normal and normal range of motion  Neck supple  Carotid bruit is not present  No thyroid mass and no thyromegaly present  Cardiovascular: Normal rate, regular rhythm, S1 normal, S2 normal, normal heart sounds and intact distal pulses  No murmur heard  Pulses:       Radial pulses are 2+ on the right side, and 2+ on the left side  Posterior tibial pulses are 2+ on the right side, and 2+ on the left side  Varicose veins noted in LE b/l   Pulmonary/Chest: Effort normal and breath sounds normal  She has no decreased breath sounds  She has no wheezes  She has no rhonchi  She has no rales  Abdominal: Soft  Normal appearance and bowel sounds are normal  She exhibits no distension and no mass  There is no hepatosplenomegaly  There is no tenderness  No hernia  Musculoskeletal: Normal range of motion  She exhibits no edema  Heel spur palpable on the posterior right heel   Lymphadenopathy:     She has no cervical adenopathy  Neurological: She is alert  She has normal strength  No sensory deficit (light touch sensation intact and equal in UE and LE bilaterally)  Skin: Skin is warm and dry  No rash noted  Psychiatric: She has a normal mood and affect  Her behavior is normal    Vitals reviewed        Pertinent Laboratory/Diagnostic Studies:  Lab Results   Component Value Date    BUN 18 04/13/2018    CREATININE 0 72 04/13/2018    CALCIUM 9 3 04/13/2018     04/13/2018    K 4 0 04/13/2018    CO2 28 04/13/2018     04/13/2018     Lab Results   Component Value Date    ALT 22 04/13/2018    AST 14 04/13/2018    ALKPHOS 40 (L) 04/13/2018     Lab Results   Component Value Date    TRIG 75 09/06/2017     Lab Results   Component Value Date    HDL 65 (H) 09/06/2017     Lab Results   Component Value Date    LDLCALC 92 09/06/2017     Lab Results   Component Value Date    HGBA1C 5 2 09/06/2017       Results for orders placed or performed in visit on 04/13/18   Comprehensive metabolic panel   Result Value Ref Range    Sodium 137 136 - 145 mmol/L    Potassium 4 0 3 5 - 5 3 mmol/L    Chloride 105 100 - 108 mmol/L    CO2 28 21 - 32 mmol/L    ANION GAP 4 4 - 13 mmol/L    BUN 18 5 - 25 mg/dL    Creatinine 0 72 0 60 - 1 30 mg/dL    Glucose 93 65 - 140 mg/dL    Calcium 9 3 mg/dL    AST 14 5 - 45 U/L    ALT 22 12 - 78 U/L    Alkaline Phosphatase 40 (L) 46 - 116 U/L    Total Protein 7 6 6 4 - 8 2 g/dL    Albumin 4 0 3 5 - 5 0 g/dL    Total Bilirubin 1 21 (H) 0 20 - 1 00 mg/dL    eGFR 86 ml/min/1 73sq m       Orders Placed This Encounter   Procedures    PNEUMOCOCCAL POLYSACCHARIDE VACCINE 23-VALENT =>3YO SQ IM    Comprehensive metabolic panel       ALLERGIES:  No Known Allergies    Current Medications     Current Outpatient Prescriptions   Medication Sig Dispense Refill    b complex vitamins capsule Take 1 capsule by mouth daily      Calcium Carbonate (CALCIUM 600 PO) Take 700 mg by mouth daily      cholecalciferol (VITAMIN D3) 1,000 units tablet Take by mouth      COLLAGEN PO Take by mouth daily      denosumab (PROLIA) 60 mg/mL Inject 1 mL (60 mg total) under the skin once for 1 dose   Repeat every 6 months  1 Syringe 0    esomeprazole (NEXIUM) 40 MG capsule Take 1 capsule by mouth daily      Omega-3 Fatty Acids (OMEGA-3 CF) 1000 MG CAPS Take by mouth      Resveratrol 250 MG CAPS Take by mouth daily      Turmeric (CURCUMIN 95 PO) Take 400 mg by mouth daily      Zoster Vac Recomb Adjuvanted (SHINGRIX) 50 MCG SUSR Inject 50 mcg into a muscle once for 1 dose 1 each 0     No current facility-administered medications for this visit            Health Maintenance     Health Maintenance   Topic Date Due    Pneumococcal PPSV23/PCV13 65+ Years / Low and Medium Risk (2 of 2 - PPSV23) 08/21/2018    INFLUENZA VACCINE  09/01/2018    MAMMOGRAM  01/16/2019    Fall Risk  08/28/2019    Depression Screening PHQ-9  08/28/2019    Urinary Incontinence Screening  08/28/2019    DXA SCAN  08/31/2019    CRC Screening: Colonoscopy  08/13/2023    DTaP,Tdap,and Td Vaccines (2 - Td) 01/31/2027     Immunization History   Administered Date(s) Administered    H1N1, All Formulations 09/18/2014    Influenza Split High Dose Preservative Free IM 10/28/2015, 11/21/2016, 08/21/2017    Influenza TIV (IM) 12/26/2012    Pneumococcal Conjugate 13-Valent 08/21/2017    Pneumococcal Polysaccharide PPV23 08/28/2018    Tdap 01/31/2017    Tuberculin Skin Test-PPD Intradermal 09/03/2013    Zoster 12/26/2012       Ambrose Bland PA-C  8/28/2018 7:45 PM  [facility]

## 2018-08-27 RX ORDER — MELATONIN
1000 DAILY
COMMUNITY
End: 2020-04-22 | Stop reason: SDUPTHER

## 2018-08-27 RX ORDER — ESOMEPRAZOLE MAGNESIUM 40 MG/1
1 CAPSULE, DELAYED RELEASE ORAL DAILY
COMMUNITY

## 2018-08-28 ENCOUNTER — OFFICE VISIT (OUTPATIENT)
Dept: FAMILY MEDICINE CLINIC | Facility: CLINIC | Age: 68
End: 2018-08-28
Payer: COMMERCIAL

## 2018-08-28 VITALS
BODY MASS INDEX: 27.7 KG/M2 | OXYGEN SATURATION: 97 % | DIASTOLIC BLOOD PRESSURE: 72 MMHG | WEIGHT: 150.5 LBS | HEART RATE: 72 BPM | HEIGHT: 62 IN | SYSTOLIC BLOOD PRESSURE: 100 MMHG

## 2018-08-28 DIAGNOSIS — Z00.00 WELL ADULT EXAM: Primary | ICD-10-CM

## 2018-08-28 DIAGNOSIS — M15.9 GENERALIZED OSTEOARTHRITIS: ICD-10-CM

## 2018-08-28 DIAGNOSIS — M81.0 OSTEOPOROSIS, UNSPECIFIED OSTEOPOROSIS TYPE, UNSPECIFIED PATHOLOGICAL FRACTURE PRESENCE: ICD-10-CM

## 2018-08-28 DIAGNOSIS — Z23 NEED FOR SHINGLES VACCINE: ICD-10-CM

## 2018-08-28 DIAGNOSIS — Z23 NEED FOR PNEUMOCOCCAL VACCINATION: ICD-10-CM

## 2018-08-28 DIAGNOSIS — K21.9 GASTROESOPHAGEAL REFLUX DISEASE, ESOPHAGITIS PRESENCE NOT SPECIFIED: ICD-10-CM

## 2018-08-28 PROBLEM — R73.09 ABNORMAL GLUCOSE LEVEL: Status: RESOLVED | Noted: 2017-08-21 | Resolved: 2018-08-28

## 2018-08-28 PROCEDURE — 90732 PPSV23 VACC 2 YRS+ SUBQ/IM: CPT

## 2018-08-28 PROCEDURE — 99397 PER PM REEVAL EST PAT 65+ YR: CPT | Performed by: PHYSICIAN ASSISTANT

## 2018-08-28 PROCEDURE — 1160F RVW MEDS BY RX/DR IN RCRD: CPT

## 2018-08-28 PROCEDURE — 90471 IMMUNIZATION ADMIN: CPT

## 2018-08-28 PROCEDURE — 1160F RVW MEDS BY RX/DR IN RCRD: CPT | Performed by: PHYSICIAN ASSISTANT

## 2018-08-28 RX ORDER — VITAMIN B COMPLEX
1 CAPSULE ORAL DAILY
COMMUNITY
End: 2019-08-23

## 2018-08-28 RX ORDER — PHENOL 1.4 %
AEROSOL, SPRAY (ML) MUCOUS MEMBRANE DAILY
COMMUNITY
End: 2021-08-25

## 2018-08-28 NOTE — ASSESSMENT & PLAN NOTE
She will continue her Prolia injections every 6 months  She will be due for her DEXA scan in a year  She was provided a lab to recheck her electrolytes including her calcium prior to next injection  She should continue with her and vitamin-D supplementation as well

## 2018-08-28 NOTE — ASSESSMENT & PLAN NOTE
Will continue to follow with Orthopedics as directed   Can continue ibuprofen as needed for pain relief

## 2018-08-28 NOTE — ASSESSMENT & PLAN NOTE
Stable  She will continue with Nexium 40 milligrams daily  She notes that she has decreasing her her gastroenterologist but was discouraged doing she and said for knee pain

## 2018-10-17 ENCOUNTER — APPOINTMENT (OUTPATIENT)
Dept: LAB | Facility: MEDICAL CENTER | Age: 68
End: 2018-10-17
Payer: COMMERCIAL

## 2018-10-17 DIAGNOSIS — M81.0 OSTEOPOROSIS, UNSPECIFIED OSTEOPOROSIS TYPE, UNSPECIFIED PATHOLOGICAL FRACTURE PRESENCE: ICD-10-CM

## 2018-10-17 LAB
ALBUMIN SERPL BCP-MCNC: 3.9 G/DL (ref 3.5–5)
ALP SERPL-CCNC: 34 U/L (ref 46–116)
ALT SERPL W P-5'-P-CCNC: 26 U/L (ref 12–78)
ANION GAP SERPL CALCULATED.3IONS-SCNC: 6 MMOL/L (ref 4–13)
AST SERPL W P-5'-P-CCNC: 14 U/L (ref 5–45)
BILIRUB SERPL-MCNC: 1.25 MG/DL (ref 0.2–1)
BUN SERPL-MCNC: 18 MG/DL (ref 5–25)
CALCIUM SERPL-MCNC: 8.9 MG/DL (ref 8.3–10.1)
CHLORIDE SERPL-SCNC: 107 MMOL/L (ref 100–108)
CO2 SERPL-SCNC: 27 MMOL/L (ref 21–32)
CREAT SERPL-MCNC: 0.67 MG/DL (ref 0.6–1.3)
GFR SERPL CREATININE-BSD FRML MDRD: 90 ML/MIN/1.73SQ M
GLUCOSE SERPL-MCNC: 91 MG/DL (ref 65–140)
POTASSIUM SERPL-SCNC: 4.6 MMOL/L (ref 3.5–5.3)
PROT SERPL-MCNC: 7.1 G/DL (ref 6.4–8.2)
SODIUM SERPL-SCNC: 140 MMOL/L (ref 136–145)

## 2018-10-17 PROCEDURE — 36415 COLL VENOUS BLD VENIPUNCTURE: CPT

## 2018-10-17 PROCEDURE — 80053 COMPREHEN METABOLIC PANEL: CPT

## 2018-10-19 ENCOUNTER — TELEPHONE (OUTPATIENT)
Dept: FAMILY MEDICINE CLINIC | Facility: CLINIC | Age: 68
End: 2018-10-19

## 2018-10-19 NOTE — TELEPHONE ENCOUNTER
Kim Gudino from [facility]) pending case # [id number]  FKW#[phone] and faxed office last office note for aporovel

## 2018-10-22 DIAGNOSIS — M81.0 OSTEOPOROSIS, UNSPECIFIED OSTEOPOROSIS TYPE, UNSPECIFIED PATHOLOGICAL FRACTURE PRESENCE: ICD-10-CM

## 2018-10-22 NOTE — TELEPHONE ENCOUNTER
Authorization Azul from [facility] for [facility]  Auth# [id number]  10- to 10-  Pharmacy informed and patient informed

## 2018-10-23 ENCOUNTER — CLINICAL SUPPORT (OUTPATIENT)
Dept: FAMILY MEDICINE CLINIC | Facility: CLINIC | Age: 68
End: 2018-10-23
Payer: COMMERCIAL

## 2018-10-23 DIAGNOSIS — M81.0 OSTEOPOROSIS, UNSPECIFIED OSTEOPOROSIS TYPE, UNSPECIFIED PATHOLOGICAL FRACTURE PRESENCE: Primary | ICD-10-CM

## 2018-10-23 DIAGNOSIS — Z23 NEED FOR INFLUENZA VACCINATION: ICD-10-CM

## 2018-10-23 PROCEDURE — 90471 IMMUNIZATION ADMIN: CPT | Performed by: PHYSICIAN ASSISTANT

## 2018-10-23 PROCEDURE — 96372 THER/PROPH/DIAG INJ SC/IM: CPT | Performed by: PHYSICIAN ASSISTANT

## 2018-10-23 PROCEDURE — 90662 IIV NO PRSV INCREASED AG IM: CPT | Performed by: PHYSICIAN ASSISTANT

## 2019-01-15 ENCOUNTER — TRANSCRIBE ORDERS (OUTPATIENT)
Dept: ADMINISTRATIVE | Facility: HOSPITAL | Age: 69
End: 2019-01-15

## 2019-01-15 DIAGNOSIS — Z12.31 VISIT FOR SCREENING MAMMOGRAM: Primary | ICD-10-CM

## 2019-02-15 ENCOUNTER — HOSPITAL ENCOUNTER (OUTPATIENT)
Dept: MAMMOGRAPHY | Facility: MEDICAL CENTER | Age: 69
Discharge: HOME | End: 2019-02-15
Payer: COMMERCIAL

## 2019-02-15 VITALS — BODY MASS INDEX: 26.87 KG/M2 | HEIGHT: 62 IN | WEIGHT: 146 LBS

## 2019-02-15 DIAGNOSIS — Z12.31 VISIT FOR SCREENING MAMMOGRAM: ICD-10-CM

## 2019-02-15 PROCEDURE — 77067 SCR MAMMO BI INCL CAD: CPT

## 2019-04-02 ENCOUNTER — TELEPHONE (OUTPATIENT)
Dept: FAMILY MEDICINE CLINIC | Facility: CLINIC | Age: 69
End: 2019-04-02

## 2019-04-05 ENCOUNTER — APPOINTMENT (OUTPATIENT)
Dept: LAB | Facility: MEDICAL CENTER | Age: 69
End: 2019-04-05
Payer: COMMERCIAL

## 2019-04-05 ENCOUNTER — TRANSCRIBE ORDERS (OUTPATIENT)
Dept: ADMINISTRATIVE | Facility: HOSPITAL | Age: 69
End: 2019-04-05

## 2019-04-05 DIAGNOSIS — M19.071 ARTHRITIS OF ANKLE, RIGHT: Primary | ICD-10-CM

## 2019-04-05 DIAGNOSIS — M19.071 ARTHRITIS OF ANKLE, RIGHT: ICD-10-CM

## 2019-04-05 LAB
25(OH)D3 SERPL-MCNC: 26.3 NG/ML (ref 30–100)
VIT B12 SERPL-MCNC: 902 PG/ML (ref 100–900)

## 2019-04-05 PROCEDURE — 36415 COLL VENOUS BLD VENIPUNCTURE: CPT

## 2019-04-05 PROCEDURE — 82306 VITAMIN D 25 HYDROXY: CPT

## 2019-04-05 PROCEDURE — 82607 VITAMIN B-12: CPT

## 2019-04-15 DIAGNOSIS — M81.0 OSTEOPOROSIS, UNSPECIFIED OSTEOPOROSIS TYPE, UNSPECIFIED PATHOLOGICAL FRACTURE PRESENCE: ICD-10-CM

## 2019-04-22 ENCOUNTER — TELEPHONE (OUTPATIENT)
Dept: FAMILY MEDICINE CLINIC | Facility: CLINIC | Age: 69
End: 2019-04-22

## 2019-08-18 NOTE — PROGRESS NOTES
ADULT ANNUAL PHYSICAL  [facility] - [facility] PRIMARY CARE    NAME: Vlad Ng  AGE: 78 y o  SEX: female  : 1950     DATE: 2019     Assessment and Plan:     Problem List Items Addressed This Visit        Digestive    Esophageal reflux     Well controlled  She will continue to follow with Gastroenterology who has her on Nexium 40 mg daily  Musculoskeletal and Integument    Osteoporosis     Patient will restart her Prolia injections  She was given a slip to recheck DEXA scan  Relevant Medications    denosumab (PROLIA) 60 mg/mL    Generalized osteoarthritis     Controlled  She will continue to follow with Orthopedics and get corticosteroid injections as needed  Other    Overweight (BMI 25 0-29  9)     Patient was encouraged to get regular exercise  BMI Counseling: Body mass index is 27 71 kg/m²  Discussed the patient's BMI with her  The BMI is above average  BMI counseling and education was provided to the patient  Nutrition recommendations include 3-5 servings of fruits/vegetables daily  Exercise recommendations include exercising 3-5 times per week  Other Visit Diagnoses     Annual physical exam    -  Primary    Asymptomatic postmenopausal state        Relevant Orders    DXA bone density spine hip and pelvis    Fatigue, unspecified type        Relevant Orders    CBC and differential    Comprehensive metabolic panel    TSH, 3rd generation with Free T4 reflex    Lipid Panel with Direct LDL reflex          Immunizations and preventive care screenings were discussed with patient today  Appropriate education was printed on patient's after visit summary            Return in 1 year (on 2020) for Annual physical      Chief Complaint:     Chief Complaint   Patient presents with    Annual Exam      History of Present Illness:     Adult Annual Physical   Patient here for a comprehensive physical exam  The patient reports problems - as below  The patient reports that GERD has been well-controlled with Nexium 40 mg  daily  She sees GI yearly  The patient notes that her OA has been persistent  She does still follow with Orthopedics and uses ibuprofen as needed for pain  She had knee injections and helped  She notes that she was not fond of the podiatrist and will be switching  She notes that her osteoporosis is still being treated with Prolia (but missed the April dose) as well as calcium and vitamin D supplementation  She is due for her DEXA repeat this month  She notes that she has been sleeping 7 hours a night and feels good when wakes  She notes that she has a slump around 2 pm and notes that she has been feeling tired  She states that she has the ability to stay awake if moves around a lot to make dinner but she has this fatigue daily  Diet and Physical Activity  · Diet/Nutrition: well balanced diet and consuming 3-5 servings of fruits/vegetables daily  · Exercise: walking and about 5 times a week  Depression Screening  PHQ-9 Depression Screening    PHQ-9:    Frequency of the following problems over the past two weeks:       Little interest or pleasure in doing things:  0 - not at all  Feeling down, depressed, or hopeless:  0 - not at all  PHQ-2 Score:  0       General Health  · Sleep: sleeps well and as above  · Hearing: normal - bilateral   · Vision: vision problems: cataracts, goes for regular eye exams and wears glasses  · Dental: regular dental visits  /GYN Health  · Patient is: postmenopausal  · Sees GYN yearly   Review of Systems:     Review of Systems   Constitutional: Negative for chills and fever  HENT: Negative for rhinorrhea and sore throat  Eyes: Negative for visual disturbance  Respiratory: Negative for cough, shortness of breath and wheezing  Cardiovascular: Negative for chest pain, palpitations and leg swelling     Gastrointestinal: Negative for abdominal pain, constipation, diarrhea, nausea and vomiting  Endocrine: Negative for polydipsia and polyuria  Genitourinary: Negative for dysuria and frequency  Musculoskeletal: Positive for arthralgias  Negative for myalgias  Skin: Negative for rash  Neurological: Negative for dizziness, syncope and headaches  Hematological: Does not bruise/bleed easily  Psychiatric/Behavioral: Negative for dysphoric mood  The patient is not nervous/anxious  Past Medical History:     History reviewed  No pertinent past medical history     Past Surgical History:     Past Surgical History:   Procedure Laterality Date    PERIURETHRAL ABSCESS DRAINAGE  1979      Social History:     Social History     Socioeconomic History    Marital status: /Civil Union     Spouse name: None    Number of children: None    Years of education: None    Highest education level: None   Occupational History    None   Social Needs    Financial resource strain: None    Food insecurity:     Worry: None     Inability: None    Transportation needs:     Medical: None     Non-medical: None   Tobacco Use    Smoking status: Never Smoker    Smokeless tobacco: Never Used   Substance and Sexual Activity    Alcohol use: No    Drug use: No    Sexual activity: None   Lifestyle    Physical activity:     Days per week: None     Minutes per session: None    Stress: None   Relationships    Social connections:     Talks on phone: None     Gets together: None     Attends [employer] service: None     Active member of club or organization: None     Attends meetings of clubs or organizations: None     Relationship status: None    Intimate partner violence:     Fear of current or ex partner: None     Emotionally abused: None     Physically abused: None     Forced sexual activity: None   Other Topics Concern    None   Social History Narrative    None      Family History:     Family History   Problem Relation Age of Onset    Stroke Mother     Prostate cancer Father     Rheum arthritis Sister     Diabetes Brother     No Known Problems Maternal Grandmother     Lung cancer Maternal Grandfather     Stomach cancer Paternal Grandmother     Prostate cancer Paternal Grandfather       Current Medications:     Current Outpatient Medications   Medication Sig Dispense Refill    Calcium Carbonate (CALCIUM 600 PO) Take 700 mg by mouth 2 (two) times a day       cholecalciferol (VITAMIN D3) 1,000 units tablet Take 1,000 Units by mouth daily       COLLAGEN PO Take by mouth daily      esomeprazole (NEXIUM) 40 MG capsule Take 1 capsule by mouth daily      Fenoprofen Calcium 400 MG CAPS Take by mouth 3 (three) times a day as needed      NON FORMULARY CBD OIL CAPSULES      Omega-3 Fatty Acids (OMEGA-3 CF) 1000 MG CAPS Take by mouth      Resveratrol 250 MG CAPS Take by mouth daily      Turmeric (CURCUMIN 95 PO) Take 400 mg by mouth daily      denosumab (PROLIA) 60 mg/mL Inject 1 mL (60 mg total) under the skin once for 1 dose   Repeat every 6 months  1 Syringe 1    denosumab (PROLIA) 60 mg/mL Inject 1 mL (60 mg total) under the skin once for 1 dose 1 mL 1     No current facility-administered medications for this visit  Allergies:     No Known Allergies   Physical Exam:     /72 (BP Location: Left arm, Patient Position: Sitting, Cuff Size: Standard)   Pulse 64   Temp 98 6 °F (37 °C) (Tympanic)   Ht 5' 2" (1 575 m)   Wt 68 7 kg (151 lb 8 oz)   SpO2 97%   BMI 27 71 kg/m²     Physical Exam   Constitutional: She appears well-developed and well-nourished  No distress  HENT:   Head: Normocephalic and atraumatic  Right Ear: Hearing, tympanic membrane, external ear and ear canal normal    Left Ear: Hearing, tympanic membrane, external ear and ear canal normal    Nose: Nose normal    Mouth/Throat: Oropharynx is clear and moist and mucous membranes are normal    Eyes: Pupils are equal, round, and reactive to light   Conjunctivae and lids are normal    Neck: Trachea normal and normal range of motion  Neck supple  Carotid bruit is not present  No thyroid mass and no thyromegaly present  Cardiovascular: Normal rate, regular rhythm, S1 normal, S2 normal, normal heart sounds and intact distal pulses  No murmur heard  Pulses:       Radial pulses are 2+ on the right side, and 2+ on the left side  Posterior tibial pulses are 2+ on the right side, and 2+ on the left side  Pulmonary/Chest: Effort normal and breath sounds normal  She has no decreased breath sounds  She has no wheezes  She has no rhonchi  She has no rales  Abdominal: Soft  Normal appearance and bowel sounds are normal  She exhibits no distension and no mass  There is no hepatosplenomegaly  There is no tenderness  No hernia  Musculoskeletal: Normal range of motion  She exhibits no edema  Lymphadenopathy:     She has no cervical adenopathy  Neurological: She is alert  She has normal strength  No sensory deficit (light touch sensation intact and equal in UE and LE bilaterally)  Skin: Skin is warm and dry  No rash noted  Psychiatric: She has a normal mood and affect  Her behavior is normal    Vitals reviewed        Gail Pacheco PA-C  [facility] PRIMARY CARE

## 2019-08-23 ENCOUNTER — OFFICE VISIT (OUTPATIENT)
Dept: FAMILY MEDICINE CLINIC | Facility: CLINIC | Age: 69
End: 2019-08-23
Payer: COMMERCIAL

## 2019-08-23 VITALS
SYSTOLIC BLOOD PRESSURE: 102 MMHG | WEIGHT: 151.5 LBS | TEMPERATURE: 98.6 F | OXYGEN SATURATION: 97 % | HEART RATE: 64 BPM | HEIGHT: 62 IN | DIASTOLIC BLOOD PRESSURE: 72 MMHG | BODY MASS INDEX: 27.88 KG/M2

## 2019-08-23 DIAGNOSIS — R53.83 FATIGUE, UNSPECIFIED TYPE: ICD-10-CM

## 2019-08-23 DIAGNOSIS — M81.0 OSTEOPOROSIS, UNSPECIFIED OSTEOPOROSIS TYPE, UNSPECIFIED PATHOLOGICAL FRACTURE PRESENCE: ICD-10-CM

## 2019-08-23 DIAGNOSIS — E66.3 OVERWEIGHT (BMI 25.0-29.9): ICD-10-CM

## 2019-08-23 DIAGNOSIS — Z00.00 ANNUAL PHYSICAL EXAM: Primary | ICD-10-CM

## 2019-08-23 DIAGNOSIS — K21.9 GASTROESOPHAGEAL REFLUX DISEASE, ESOPHAGITIS PRESENCE NOT SPECIFIED: ICD-10-CM

## 2019-08-23 DIAGNOSIS — M15.9 GENERALIZED OSTEOARTHRITIS: ICD-10-CM

## 2019-08-23 DIAGNOSIS — Z78.0 ASYMPTOMATIC POSTMENOPAUSAL STATE: ICD-10-CM

## 2019-08-23 PROCEDURE — 1160F RVW MEDS BY RX/DR IN RCRD: CPT | Performed by: PHYSICIAN ASSISTANT

## 2019-08-23 PROCEDURE — 99397 PER PM REEVAL EST PAT 65+ YR: CPT | Performed by: PHYSICIAN ASSISTANT

## 2019-08-23 RX ORDER — FENOPROFEN CALCIUM 400 MG/1
CAPSULE ORAL 3 TIMES DAILY PRN
COMMUNITY
End: 2020-01-09

## 2019-08-23 NOTE — ASSESSMENT & PLAN NOTE
Patient was encouraged to get regular exercise  BMI Counseling: Body mass index is 27 71 kg/m²  Discussed the patient's BMI with her  The BMI is above average  BMI counseling and education was provided to the patient  Nutrition recommendations include 3-5 servings of fruits/vegetables daily  Exercise recommendations include exercising 3-5 times per week

## 2019-08-23 NOTE — ASSESSMENT & PLAN NOTE
Will check labs as ordered  Encouraged to decrease intake of chocolate in the afternoon as she might be experiencing a drop in her blood sugar after a spike from the candy that is causing this fatigue

## 2019-08-23 NOTE — PATIENT INSTRUCTIONS

## 2019-08-23 NOTE — ASSESSMENT & PLAN NOTE
Well controlled  She will continue to follow with Gastroenterology who has her on Nexium 40 mg daily

## 2019-08-23 NOTE — ASSESSMENT & PLAN NOTE
Controlled  She will continue to follow with Orthopedics and get corticosteroid injections as needed

## 2019-09-03 ENCOUNTER — TELEPHONE (OUTPATIENT)
Dept: FAMILY MEDICINE CLINIC | Facility: CLINIC | Age: 69
End: 2019-09-03

## 2019-09-03 NOTE — TELEPHONE ENCOUNTER
Pt called stating she tried to get her prolia from the pharmacy but was told it needs an authorization  I reviewed the chart and there is an auth valid from 10/19/18 - 10/18/19 that is good for 2 injections  Per Solange's note, pt had injection in October and then missed the injection in April, so was going to have one now  I called the pharmacy and let them know I have an active authorization and was told I would need to talk to the insurance because they are getting flagged for authorization  I called the pharmacy affairs department of [facility] at [phone] and spoke with [facility] who looked into the authorization and states it was a medical authorization so I would need to speak with a   She transferred me to that department and I waited on hold for an additional 16 minutes without an answer  Will have to try again tomorrow

## 2019-09-04 ENCOUNTER — HOSPITAL ENCOUNTER (OUTPATIENT)
Dept: BONE DENSITY | Facility: MEDICAL CENTER | Age: 69
Discharge: HOME | End: 2019-09-04
Payer: COMMERCIAL

## 2019-09-04 DIAGNOSIS — Z78.0 ASYMPTOMATIC POSTMENOPAUSAL STATE: ICD-10-CM

## 2019-09-04 PROCEDURE — 77080 DXA BONE DENSITY AXIAL: CPT

## 2019-09-04 NOTE — TELEPHONE ENCOUNTER
Jeferson rodriguez that he spoke to ins and they have nothing on file  Needs [facility]  I faxed him a hard copy of approval which went through her medical for [facility]

## 2019-09-04 NOTE — TELEPHONE ENCOUNTER
I spoke w Bennett Ingram at medical [facility] [phone]  She verified that [facility] is on file and active  Not sure why pharmacy got red flag

## 2019-09-04 NOTE — TELEPHONE ENCOUNTER
Spoke to Jami Buenrostro , pharmacist at [facility] and he will check into it  Gave case # and auth  dates

## 2019-09-10 ENCOUNTER — TELEPHONE (OUTPATIENT)
Dept: FAMILY MEDICINE CLINIC | Facility: CLINIC | Age: 69
End: 2019-09-10

## 2019-09-10 DIAGNOSIS — M81.0 OSTEOPOROSIS, UNSPECIFIED OSTEOPOROSIS TYPE, UNSPECIFIED PATHOLOGICAL FRACTURE PRESENCE: Primary | ICD-10-CM

## 2019-09-10 NOTE — TELEPHONE ENCOUNTER
----- Message from Shania Rucker PA-C sent at 9/10/2019 12:15 AM EDT -----  Please let the patient know that her DEXA scan showed osteoporosis in her total hip as well as forearm  It is recommended that she restart Prolia and follow this with IV Reclast in several years  It was also recommended that she get labs done to check her vitamin-D and PTH levels in addition to the labs ordered at her recent visit  Please provide her slip to get these done  Please encourage patient to get at least 1200 mg of calcium and 800-1000 units of vitamin D daily between diet and supplementation  Please encourage regular weight bearing exercise and avoidance of smoking as well as excessive alcohol intake  The patient should plan on repeating the DEXA scan in 2 years

## 2019-09-10 NOTE — TELEPHONE ENCOUNTER
Results were re viewed with pt  Labs were mailed to pt today    Per German Nelson pt need to schedule an appointment with Endo since her insurance approved  Prolia  injection only if she get it  at the specialist

## 2019-09-10 NOTE — TELEPHONE ENCOUNTER
Spoke with pt today, labs were review and told pt that she need to restart taking her Prolia injection, pt mentioned that she called [address] and they told her again that  this injection was declined by her insurance  Call pt's insurance @ [phone] spoke with Toña Romero , she did review that letter that we received ,  Prolia was approve under  her medical plan and not by her prescription plan  What this means is that pt is able to get this medication through an specialist, if she want us to get an approval through her prescription plan we need to do  a prior authorization and her prescription has to be fax to [address] [place] [address] at [phone] , Phone number [phone] but pt has to register first   Spoke with Mansi Mendoza, pt was referred to Endocrinology  So she can start with her Prolia Injection

## 2019-09-19 ENCOUNTER — APPOINTMENT (OUTPATIENT)
Dept: LAB | Facility: MEDICAL CENTER | Age: 69
End: 2019-09-19
Payer: COMMERCIAL

## 2019-09-19 DIAGNOSIS — M81.0 OSTEOPOROSIS, UNSPECIFIED OSTEOPOROSIS TYPE, UNSPECIFIED PATHOLOGICAL FRACTURE PRESENCE: ICD-10-CM

## 2019-09-19 DIAGNOSIS — R53.83 FATIGUE, UNSPECIFIED TYPE: ICD-10-CM

## 2019-09-19 PROBLEM — E78.5 HYPERLIPIDEMIA: Status: ACTIVE | Noted: 2019-09-19

## 2019-09-19 LAB
25(OH)D3 SERPL-MCNC: 26.4 NG/ML (ref 30–100)
ALBUMIN SERPL BCP-MCNC: 4.2 G/DL (ref 3.5–5)
ALP SERPL-CCNC: 60 U/L (ref 46–116)
ALT SERPL W P-5'-P-CCNC: 22 U/L (ref 12–78)
ANION GAP SERPL CALCULATED.3IONS-SCNC: 7 MMOL/L (ref 4–13)
AST SERPL W P-5'-P-CCNC: 13 U/L (ref 5–45)
BASOPHILS # BLD AUTO: 0.05 THOUSANDS/ΜL (ref 0–0.1)
BASOPHILS NFR BLD AUTO: 1 % (ref 0–1)
BILIRUB SERPL-MCNC: 1.08 MG/DL (ref 0.2–1)
BUN SERPL-MCNC: 14 MG/DL (ref 5–25)
CALCIUM SERPL-MCNC: 9.7 MG/DL (ref 8.3–10.1)
CHLORIDE SERPL-SCNC: 106 MMOL/L (ref 100–108)
CHOLEST SERPL-MCNC: 199 MG/DL (ref 50–200)
CO2 SERPL-SCNC: 27 MMOL/L (ref 21–32)
CREAT SERPL-MCNC: 0.76 MG/DL (ref 0.6–1.3)
EOSINOPHIL # BLD AUTO: 0.15 THOUSAND/ΜL (ref 0–0.61)
EOSINOPHIL NFR BLD AUTO: 3 % (ref 0–6)
ERYTHROCYTE [DISTWIDTH] IN BLOOD BY AUTOMATED COUNT: 14.7 % (ref 11.6–15.1)
GFR SERPL CREATININE-BSD FRML MDRD: 80 ML/MIN/1.73SQ M
GLUCOSE P FAST SERPL-MCNC: 91 MG/DL (ref 65–99)
HCT VFR BLD AUTO: 38.3 % (ref 34.8–46.1)
HDLC SERPL-MCNC: 67 MG/DL (ref 40–60)
HGB BLD-MCNC: 12.3 G/DL (ref 11.5–15.4)
IMM GRANULOCYTES # BLD AUTO: 0.01 THOUSAND/UL (ref 0–0.2)
IMM GRANULOCYTES NFR BLD AUTO: 0 % (ref 0–2)
LDLC SERPL CALC-MCNC: 113 MG/DL (ref 0–100)
LYMPHOCYTES # BLD AUTO: 1.4 THOUSANDS/ΜL (ref 0.6–4.47)
LYMPHOCYTES NFR BLD AUTO: 30 % (ref 14–44)
MCH RBC QN AUTO: 29 PG (ref 26.8–34.3)
MCHC RBC AUTO-ENTMCNC: 32.1 G/DL (ref 31.4–37.4)
MCV RBC AUTO: 90 FL (ref 82–98)
MONOCYTES # BLD AUTO: 0.26 THOUSAND/ΜL (ref 0.17–1.22)
MONOCYTES NFR BLD AUTO: 6 % (ref 4–12)
NEUTROPHILS # BLD AUTO: 2.73 THOUSANDS/ΜL (ref 1.85–7.62)
NEUTS SEG NFR BLD AUTO: 60 % (ref 43–75)
NRBC BLD AUTO-RTO: 0 /100 WBCS
PLATELET # BLD AUTO: 180 THOUSANDS/UL (ref 149–390)
PMV BLD AUTO: 9 FL (ref 8.9–12.7)
POTASSIUM SERPL-SCNC: 4.8 MMOL/L (ref 3.5–5.3)
PROT SERPL-MCNC: 7.2 G/DL (ref 6.4–8.2)
PTH-INTACT SERPL-MCNC: 56.3 PG/ML (ref 18.4–80.1)
RBC # BLD AUTO: 4.24 MILLION/UL (ref 3.81–5.12)
SODIUM SERPL-SCNC: 140 MMOL/L (ref 136–145)
TRIGL SERPL-MCNC: 94 MG/DL
TSH SERPL DL<=0.05 MIU/L-ACNC: 2.67 UIU/ML (ref 0.36–3.74)
WBC # BLD AUTO: 4.6 THOUSAND/UL (ref 4.31–10.16)

## 2019-09-19 PROCEDURE — 36415 COLL VENOUS BLD VENIPUNCTURE: CPT | Performed by: PHYSICIAN ASSISTANT

## 2019-09-19 PROCEDURE — 82306 VITAMIN D 25 HYDROXY: CPT

## 2019-09-19 PROCEDURE — 83970 ASSAY OF PARATHORMONE: CPT

## 2019-09-19 PROCEDURE — 80053 COMPREHEN METABOLIC PANEL: CPT | Performed by: PHYSICIAN ASSISTANT

## 2019-09-19 PROCEDURE — 85025 COMPLETE CBC W/AUTO DIFF WBC: CPT | Performed by: PHYSICIAN ASSISTANT

## 2019-09-19 PROCEDURE — 84443 ASSAY THYROID STIM HORMONE: CPT | Performed by: PHYSICIAN ASSISTANT

## 2019-09-19 PROCEDURE — 80061 LIPID PANEL: CPT

## 2019-10-17 ENCOUNTER — TELEPHONE (OUTPATIENT)
Dept: ENDOCRINOLOGY | Facility: CLINIC | Age: 69
End: 2019-10-17

## 2019-10-17 ENCOUNTER — CONSULT (OUTPATIENT)
Dept: ENDOCRINOLOGY | Facility: CLINIC | Age: 69
End: 2019-10-17
Payer: COMMERCIAL

## 2019-10-17 VITALS
DIASTOLIC BLOOD PRESSURE: 70 MMHG | HEART RATE: 76 BPM | WEIGHT: 151.2 LBS | BODY MASS INDEX: 27.82 KG/M2 | HEIGHT: 62 IN | SYSTOLIC BLOOD PRESSURE: 112 MMHG

## 2019-10-17 DIAGNOSIS — E55.9 VITAMIN D DEFICIENCY: ICD-10-CM

## 2019-10-17 DIAGNOSIS — M81.0 OSTEOPOROSIS, UNSPECIFIED OSTEOPOROSIS TYPE, UNSPECIFIED PATHOLOGICAL FRACTURE PRESENCE: Primary | ICD-10-CM

## 2019-10-17 PROCEDURE — 99244 OFF/OP CNSLTJ NEW/EST MOD 40: CPT | Performed by: INTERNAL MEDICINE

## 2019-10-17 NOTE — PATIENT INSTRUCTIONS
Calcium and Osteoporosis   WHAT YOU NEED TO KNOW:   Calcium is important for osteoporosis because calcium helps build bone mass  Osteoporosis is a long-term medical condition that causes your body to break down more bone than it makes  Your bones become weak, brittle, and more likely to fracture  DISCHARGE INSTRUCTIONS:   Follow up with your healthcare provider or dietitian as directed:  Write down your questions so you remember to ask them during your visits  Your calcium needs:   · Women:      ¨ 19 to 50 years: 1,000 mg    ¨ Over 50: 1,200 mg    ¨ Pregnant or breastfeeding, 19 years to 50 years: 1,000 mg    · Men:      ¨ 19 to 70: 1,000 mg    ¨ Over 70: 1,200 mg  Foods that are high in calcium: The following list shows the number of calcium milligrams (mg) per serving  Your dietitian or healthcare provider can help you create a balanced meal plan for your calcium needs  · Dairy:      ¨ 1 cup of low-fat plain yogurt (415 mg) or low-fat fruit yogurt (245 to 384 mg)    ¨ 1½ ounces of shredded cheddar cheese (306 mg) or part skim mozzarella cheese (275 mg)    ¨ 1 cup of skim, 2%, or whole milk (300 mg)    ¨ 1 cup of cottage cheese made with 2% milk fat (138 mg)    ¨ ½ cup of frozen yogurt (103 mg)    · Other foods:      ¨ 1 cup of calcium-fortified orange juice (300 mg)    ¨ ½ cup of cooked diana greens (220 mg)    ¨ 4 canned sardines, with bones (242 mg)    ¨ ½ cup of tofu (with added calcium) (204 mg)  How to get extra calcium:   · Add powdered milk to puddings, cocoa, custard, or hot cereal     · Sift powdered milk into flour when you make cakes, cookies, or breads  · Use low-fat or fat-free milk instead of water in pancake mix, mashed potatoes, pudding, or hot breakfast cereal     · Add low-fat or fat-free cheese to salad, soup, or pasta  · Add tofu (with added calcium) to vegetable stir-villeda  · Take calcium supplements if you cannot get enough calcium from the foods you eat   Your body can absorb the most calcium from supplements when you take 500 mg or less at one time  Do not take more than 2,500 mg of calcium supplements each day  © 2017 [address] Information is for End User's use only and may not be sold, redistributed or otherwise used for commercial purposes  All illustrations and images included in CareNotes® are the copyrighted property of A D A M , Inc  or Bryan Moffett  The above information is an  only  It is not intended as medical advice for individual conditions or treatments  Talk to your doctor, nurse or pharmacist before following any medical regimen to see if it is safe and effective for you  Denosumab (By injection)   Denosumab ([id number])  Treats osteoporosis, bone cancer, hypercalcemia, and other bone problems in patients who have cancer  Brand Name(s): Prolia, Xgeva   There may be other brand names for this medicine  When This Medicine Should Not Be Used: This medicine is not right for everyone  You should not receive it if you had an allergic reaction to denosumab or if you are pregnant  How to Use This Medicine:   Injectable  · A doctor or other health professional will give you this medicine  This medicine is usually given as a shot under the skin of your upper arm, upper thigh, or stomach  · This medicine should come with a Medication Guide  Ask your pharmacist for a copy if you do not have one  · Missed dose: Call your doctor or pharmacist for instructions  Drugs and Foods to Avoid:   Ask your doctor or pharmacist before using any other medicine, including over-the-counter medicines, vitamins, and herbal products  · Do not use Prolia® and Xgeva® together  They contain the same medicine  · Some medicines can affect how denosumab works  Tell your doctor if you are also using medicine that weakens your immune system, including a steroid or cancer medicine    Warnings While Using This Medicine:   · This medicine may cause birth defects if either partner is using it during conception or pregnancy  Tell your doctor right away if you or your partner becomes pregnant  Use an effective form of birth control  Women who are being treated with Deborah Salaam should continue using birth control for at least 5 months after the last dose  · Tell your doctor if you are breastfeeding, or if you have kidney disease, diabetes, gum disease, or an allergy to latex  Tell your doctor if you have problems with your thyroid, parathyroid, or digestive system  · This medicine may cause the following problems:  ¨ Low calcium levels in your blood  ¨ Increased risk of broken thigh bone  ¨ Increased risk of infections  ¨ Serious skin reactions  ¨ Severe bone, joint, or muscle pain  · This medicine can cause jaw problems  You must have regular dental exams while you are being treated with this medicine  Tell your dentist that you are using this medicine  Practice good oral hygiene  · Do not suddenly stop using Prolia® without checking first with your doctor  Doing so may increase risk for more fractures  Talk to your doctor about other medicine that you can take  · Your doctor will do lab tests at regular visits to check on the effects of this medicine  Keep all appointments    Possible Side Effects While Using This Medicine:   Call your doctor right away if you notice any of these side effects:  · Allergic reaction: Itching or hives, swelling in your face or hands, swelling or tingling in your mouth or throat, chest tightness, trouble breathing  · Blistering, peeling, red skin rash  · Chest pain, fast or uneven heartbeat, trouble breathing  · Fever, chills, cough, sore throat, body aches  · Lightheadedness, dizziness, fainting  · Muscle spasms or twitching, numbness or tingling in your fingers, toes, or lips  · Pain or burning during urination, change in how much or how often you urinate  · Pain, swelling, heavy feeling, or numbness in your mouth or jaw, loose teeth or other teeth problems  · Severe bone, joint, or muscle pain  · Unusual pain in your thigh, groin, or hip  If you notice these less serious side effects, talk with your doctor:   · Diarrhea, nausea  · Redness, pain, itching, burning, swelling, or a lump under your skin where the shot was given  · Tiredness or weakness  If you notice other side effects that you think are caused by this medicine, tell your doctor  Call your doctor for medical advice about side effects  You may report side effects to FDA at 4-333-FDA-3745  © 2017 [address] Information is for End User's use only and may not be sold, redistributed or otherwise used for commercial purposes  The above information is an  only  It is not intended as medical advice for individual conditions or treatments  Talk to your doctor, nurse or pharmacist before following any medical regimen to see if it is safe and effective for you

## 2019-10-17 NOTE — ASSESSMENT & PLAN NOTE
Counseled on increased risk of fractures  Counseled on fall prevention  It take calcium 1200 mg daily either in diet or supplementations  Vitamin D3 2000 international units daily  Discussed pharmacological therapy with either oral/IV bisphosphonates or denosumab  She wants to restart Dorothy    Will request

## 2019-10-17 NOTE — PROGRESS NOTES
Jocelyn Still 78 y o  female MRN: [id number]    Encounter: [id number]      Assessment/Plan     Problem List Items Addressed This Visit        Musculoskeletal and Integument    Osteoporosis - Primary     Counseled on increased risk of fractures  Counseled on fall prevention  It take calcium 1200 mg daily either in diet or supplementations  Vitamin D3 2000 international units daily  Discussed pharmacological therapy with either oral/IV bisphosphonates or denosumab  She wants to restart Prolia  Will request            Other    Vitamin D deficiency     Increase vitamin D supplementations to vitamin D3 2000 international units daily             CC:   Osteoporosis     History of Present Illness     HPI:  16-year-old female referred here for evaluation of osteoporosis  She was diagnosed with osteoporosis  in 2017 and Received 1 dose of Prolia in 2017 - was due in April 2018 and has not received it since then  Osteoporosis diagnosed in 2017, no falls , no assistive devices   No history of fragility fractures   Calcium 1000 mg daily - 2 servings of dairy , vitamin D3 600 iu daily  Not on chronic steroids , anti convulsants   postmenopausal since early 46s   Sister has osteoporosis   No maternal hip fracture      Review of Systems   Constitutional: Positive for fatigue  Negative for unexpected weight change  Eyes: Negative for visual disturbance  Respiratory: Negative for cough and shortness of breath  Cardiovascular: Negative for palpitations and leg swelling  Gastrointestinal: Negative for constipation, diarrhea, nausea and vomiting  Musculoskeletal: Positive for arthralgias and back pain  Skin: Negative for rash and wound  Psychiatric/Behavioral: Negative for sleep disturbance  All other systems reviewed and are negative  Historical Information   History reviewed  No pertinent past medical history    Past Surgical History:   Procedure Laterality Date    PERIURETHRAL ABSCESS DRAINAGE 5     Social History   Social History     Substance and Sexual Activity   Alcohol Use No     Social History     Substance and Sexual Activity   Drug Use No     Social History     Tobacco Use   Smoking Status Never Smoker   Smokeless Tobacco Never Used     Family History:   Family History   Problem Relation Age of Onset    Stroke Mother     Prostate cancer Father     Rheum arthritis Sister     Diabetes Brother     No Known Problems Maternal Grandmother     Lung cancer Maternal Grandfather     Stomach cancer Paternal Grandmother     Prostate cancer Paternal Grandfather        Meds/Allergies   Current Outpatient Medications   Medication Sig Dispense Refill    Calcium Carbonate (CALCIUM 600 PO) Take 700 mg by mouth 2 (two) times a day       cholecalciferol (VITAMIN D3) 1,000 units tablet Take 1,000 Units by mouth daily       denosumab (PROLIA) 60 mg/mL Inject 1 mL (60 mg total) under the skin once for 1 dose 1 mL 1    esomeprazole (NEXIUM) 40 MG capsule Take 1 capsule by mouth daily      Fenoprofen Calcium 400 MG CAPS Take by mouth 3 (three) times a day as needed      NON FORMULARY CBD OIL CAPSULES      Omega-3 Fatty Acids (OMEGA-3 CF) 1000 MG CAPS Take by mouth      Resveratrol 250 MG CAPS Take by mouth daily      Turmeric (CURCUMIN 95 PO) Take 400 mg by mouth daily      COLLAGEN PO Take by mouth daily       No current facility-administered medications for this visit  No Known Allergies    Objective   Vitals: Blood pressure 112/70, pulse 76, height 5' 2" (1 575 m), weight 68 6 kg (151 lb 3 2 oz)  Physical Exam   Constitutional: She is oriented to person, place, and time  She appears well-developed and well-nourished  No distress  HENT:   Head: Normocephalic and atraumatic  Eyes: EOM are normal  No scleral icterus  Neck: Normal range of motion  Neck supple  No thyromegaly present  Cardiovascular: Normal rate, regular rhythm and normal heart sounds     No murmur heard   Pulmonary/Chest: Effort normal and breath sounds normal  No respiratory distress  She has no wheezes  She has no rales  Abdominal: Soft  Bowel sounds are normal  She exhibits no distension  There is no tenderness  There is no guarding  Musculoskeletal: Normal range of motion  She exhibits no edema or deformity  Lymphadenopathy:     She has no cervical adenopathy  Neurological: She is alert and oriented to person, place, and time  Skin: Skin is warm and dry  Psychiatric: She has a normal mood and affect  Her behavior is normal  Judgment and thought content normal        The history was obtained from the review of the chart, patient  Lab Results:   Lab Results   Component Value Date/Time    Potassium 4 8 09/19/2019 08:46 AM    Chloride 106 09/19/2019 08:46 AM    CO2 27 09/19/2019 08:46 AM    BUN 14 09/19/2019 08:46 AM    Creatinine 0 76 09/19/2019 08:46 AM    Glucose, Fasting 91 09/19/2019 08:46 AM    Calcium 9 7 09/19/2019 08:46 AM    eGFR 80 09/19/2019 08:46 AM    TSH 3RD GENERATON 2 670 09/19/2019 08:46 AM    PTH 56 3 09/19/2019 08:46 AM    Vit D, 25-Hydroxy 26 4 (L) 09/19/2019 08:46 AM    Vit D, 25-Hydroxy 26 3 (L) 04/05/2019 09:48 AM         Imaging Studies:            Results for orders placed during the hospital encounter of 09/04/19   DXA bone density spine hip and pelvis    Impression 1  Based on the [facility] classification, this study identifies a diagnosis of osteoporosis, notable at the forearm and total hip areas and the patient is considered at increased risk for fracture  2  A daily intake of calcium of at least 1200 mg and vitamin D, 800-1000 IU, as well as weight bearing and muscle strengthening exercise, fall prevention and avoidance of tobacco and excessive alcohol intake as basic preventive measures are recommended  3  Repeat DXA scan on the same equipment in 18-24 months as clinically indicated        The 10 year risk of hip fracture is 3 1%, with the 10 year risk of major osteoporotic fracture being 14%, as calculated by the WHO fracture risk assessment tool (FRAX)  The current NOF guidelines recommend treating patients with FRAX 10 year risk score   of >3% for hip fracture and >20% for major osteoporotic fracture  Hip fracture risk slightly exceeds treatment thresholds  WHO CLASSIFICATION:  Normal (a T-score of -1 0 or higher)  Low bone mineral density (a T-score of less than -1 0 but higher than -2 5)  Osteoporosis (a T-score of -2 5 or less)  Severe osteoporosis (a T-score of -2 5 or less with a fragility fracture)    Thank you for allowing us the opportunity to participate in your patient care  The expanded DEXA report will no longer be arriving in your mail  If you desire to view the full report please contact [facility] or access the PACS system  Workstation performed: [id number]                  I have personally reviewed pertinent reports  Portions of the record may have been created with voice recognition software  Occasional wrong word or "sound a like" substitutions may have occurred due to the inherent limitations of voice recognition software  Read the chart carefully and recognize, using context, where substitutions have occurred

## 2019-10-18 ENCOUNTER — IMMUNIZATIONS (OUTPATIENT)
Dept: FAMILY MEDICINE CLINIC | Facility: CLINIC | Age: 69
End: 2019-10-18
Payer: COMMERCIAL

## 2019-10-18 DIAGNOSIS — Z23 FLU VACCINE NEED: Primary | ICD-10-CM

## 2019-10-18 PROCEDURE — 90471 IMMUNIZATION ADMIN: CPT | Performed by: PHYSICIAN ASSISTANT

## 2019-10-18 PROCEDURE — 90662 IIV NO PRSV INCREASED AG IM: CPT | Performed by: PHYSICIAN ASSISTANT

## 2019-10-28 ENCOUNTER — TELEPHONE (OUTPATIENT)
Dept: ENDOCRINOLOGY | Facility: CLINIC | Age: 69
End: 2019-10-28

## 2019-10-29 ENCOUNTER — TELEPHONE (OUTPATIENT)
Dept: ENDOCRINOLOGY | Facility: CLINIC | Age: 69
End: 2019-10-29

## 2019-11-05 ENCOUNTER — TELEPHONE (OUTPATIENT)
Dept: ENDOCRINOLOGY | Facility: CLINIC | Age: 69
End: 2019-11-05

## 2019-11-05 NOTE — TELEPHONE ENCOUNTER
Patient called to check the status of her Prolia prior authorization  She stated she checked with [employer] and they haven't received anything  Reviewed scanned documents and see insurance verification document scanned in only  Please review  Thank you!

## 2019-11-05 NOTE — TELEPHONE ENCOUNTER
Claire, I submitted the PA request  Please call the patient and let her know   Thank you, Gladys Hazel

## 2019-11-20 ENCOUNTER — OFFICE VISIT (OUTPATIENT)
Dept: ENDOCRINOLOGY | Facility: CLINIC | Age: 69
End: 2019-11-20
Payer: COMMERCIAL

## 2019-11-20 DIAGNOSIS — M81.0 OSTEOPOROSIS, UNSPECIFIED OSTEOPOROSIS TYPE, UNSPECIFIED PATHOLOGICAL FRACTURE PRESENCE: Primary | ICD-10-CM

## 2019-11-20 DIAGNOSIS — M81.0 OSTEOPOROSIS, UNSPECIFIED OSTEOPOROSIS TYPE, UNSPECIFIED PATHOLOGICAL FRACTURE PRESENCE: ICD-10-CM

## 2019-11-20 PROCEDURE — 96372 THER/PROPH/DIAG INJ SC/IM: CPT | Performed by: PHYSICIAN ASSISTANT

## 2019-11-20 NOTE — PROGRESS NOTES
Patient id here for her Prolia SQ injection, given on left arm  Patient tolerated injection well, consent signed and scanned

## 2020-01-08 NOTE — PROGRESS NOTES
FAMILY PRACTICE OFFICE VISIT  [facility]       NAME: Neida Spears  AGE: 78 y o  SEX: female       : 1950        MRN: [id number]    DATE: 2020  TIME: 3:44 PM    Assessment and Plan     Problem List Items Addressed This Visit     None      Visit Diagnoses     Pre-op examination    -  Primary    Cataract of both eyes, unspecified cataract type        To be corrected with upcoming surgery  Patient appears at acceptable risk for the proposed procedure  Surgical clearance: The patient is found to be at acceptable risk from a cardiovascular standpoint  Additional cardiac testing is not  necessary  Re-evaluation is needed if the patient should present with symptoms prior to surgery/ procedure  Surgical clearance will be faxed to Dr Marce Scott  Chief Complaint     Chief Complaint   Patient presents with    Pre-op Exam     Right eye catarat surgery on 2020 and left eye on 2020  by Dr Marce Scott       History of Present Illness   Neida Spears is a 78y o -year-old female who presents for preop visit  Pre-Op Visit:  The patient is being seen today for preoperative visit  The procedure that is scheduled is cataract surgery  It is scheduled on 20 and 2020  with Dr Marce Scott  The indication for the surgery is decreased visual acuity  Surgical Risk Assessment:  Prior Anesthesia:  The patient confirms prior anesthesia and denies problems with anesthesia  Pertinent Past Medical History:  The patient denies history of diabetes,  angina,  CAD,  arrhythmia,  DVT,  pulmonary embolism,  chronic liver disease,  chronic kidney disease,  COPD,  asthma,  thyroid disease and  seizure disorder  Exercise Capacity:  The patient confirms ability to walk 4 blocks without symptoms and confirms ability to walk two flights of stairs without symptoms  Lifestyle Factors:   The patient denies alcohol use, denies tobacco use, and denies drug use  Symptoms:  The patient denies easy bruising,  chest pain,  cough,  dyspnea,  edema,  palpitations and  wheezing  Pertinent Family History:  The patient confirms family history of  stroke, but denies family history of  ischemic heart diease,  adverse reaction to anesthesia,  aneurysm,  bleeding problems and  sudden early death  Mother had stroke  Living Situation:  The patient's reports home is secure and denies any post-op concerns with current living situation  Review of Systems   Review of Systems   Constitutional: Negative for chills and fever  HENT: Negative for congestion, rhinorrhea and sore throat  Eyes: Negative for visual disturbance  Respiratory: Negative for cough, shortness of breath and wheezing  Cardiovascular: Negative for chest pain, palpitations and leg swelling  Gastrointestinal: Negative for abdominal pain, blood in stool, constipation, diarrhea, nausea and vomiting  Endocrine: Negative for polydipsia and polyuria  Genitourinary: Negative for dysuria, frequency and hematuria  Musculoskeletal: Negative for arthralgias and myalgias  Skin: Negative for rash  Neurological: Negative for dizziness and headaches  Hematological: Does not bruise/bleed easily  Psychiatric/Behavioral: Negative for dysphoric mood  The patient is not nervous/anxious  Active Problem List     Patient Active Problem List   Diagnosis    Osteoporosis    Joint pain, knee    Generalized osteoarthritis    Esophageal reflux    Elevated bilirubin    Midline cystocele    Overweight (BMI 25 0-29  9)    Fatigue    Hyperlipidemia    Vitamin D deficiency    Postmenopausal status         Past Medical History:  History reviewed  No pertinent past medical history      Past Surgical History:  Past Surgical History:   Procedure Laterality Date    COLONOSCOPY      x2    PERIURETHRAL ABSCESS DRAINAGE  1979       Family History:  Family History   Problem Relation Age of Onset    Stroke Mother     Prostate cancer Father     Rheum arthritis Sister     Diabetes Brother     No Known Problems Maternal Grandmother     Lung cancer Maternal Grandfather     Stomach cancer Paternal Grandmother     Prostate cancer Paternal Grandfather        Social History:  Social History     Socioeconomic History    Marital status: /Civil Union     Spouse name: Not on file    Number of children: Not on file    Years of education: Not on file    Highest education level: Not on file   Occupational History    Not on file   Social Needs    Financial resource strain: Not on file    Food insecurity:     Worry: Not on file     Inability: Not on file    Transportation needs:     Medical: Not on file     Non-medical: Not on file   Tobacco Use    Smoking status: Never Smoker    Smokeless tobacco: Never Used   Substance and Sexual Activity    Alcohol use: No    Drug use: No    Sexual activity: Not on file   Lifestyle    Physical activity:     Days per week: Not on file     Minutes per session: Not on file    Stress: Not on file   Relationships    Social connections:     Talks on phone: Not on file     Gets together: Not on file     Attends Jewish service: Not on file     Active member of club or organization: Not on file     Attends meetings of clubs or organizations: Not on file     Relationship status: Not on file    Intimate partner violence:     Fear of current or ex partner: Not on file     Emotionally abused: Not on file     Physically abused: Not on file     Forced sexual activity: Not on file   Other Topics Concern    Not on file   Social History Narrative    Not on file       Objective     Vitals:    01/09/20 1515   BP: 116/80   BP Location: Left arm   Patient Position: Sitting   Cuff Size: Standard   Pulse: 72   Temp: 98 1 °F (36 7 °C)   SpO2: 97%   Weight: 66 7 kg (147 lb 2 oz)   Height: 5' 2" (1 575 m)     Wt Readings from Last 3 Encounters:   01/09/20 66 7 kg (147 lb 2 oz)   10/17/19 68 6 kg (151 lb 3 2 oz)   08/23/19 68 7 kg (151 lb 8 oz)       Physical Exam   Constitutional: She appears well-developed and well-nourished  No distress  HENT:   Head: Normocephalic and atraumatic  Right Ear: Hearing, tympanic membrane, external ear and ear canal normal    Left Ear: Hearing, tympanic membrane, external ear and ear canal normal    Nose: Nose normal    Mouth/Throat: Oropharynx is clear and moist and mucous membranes are normal    Eyes: Pupils are equal, round, and reactive to light  Conjunctivae and lids are normal    Neck: Trachea normal and normal range of motion  Neck supple  Carotid bruit is not present  No thyroid mass and no thyromegaly present  Cardiovascular: Normal rate, regular rhythm, S1 normal, S2 normal, normal heart sounds and intact distal pulses  No murmur heard  Pulses:       Radial pulses are 2+ on the right side, and 2+ on the left side  Posterior tibial pulses are 2+ on the right side, and 2+ on the left side  Pulmonary/Chest: Effort normal and breath sounds normal  She has no decreased breath sounds  She has no wheezes  She has no rhonchi  She has no rales  Abdominal: Soft  Normal appearance and bowel sounds are normal  She exhibits no distension and no mass  There is no hepatosplenomegaly  There is no tenderness  No hernia  Musculoskeletal: Normal range of motion  She exhibits no edema  Lymphadenopathy:     She has no cervical adenopathy  Neurological: She is alert  She has normal strength  No sensory deficit (light touch sensation intact and equal in UE and LE bilaterally)  Skin: Skin is warm and dry  No rash noted  Psychiatric: She has a normal mood and affect  Her behavior is normal    Vitals reviewed        Pertinent Laboratory/Diagnostic Studies:  Lab Results   Component Value Date    BUN 14 09/19/2019    CREATININE 0 76 09/19/2019    CALCIUM 9 7 09/19/2019    K 4 8 09/19/2019    CO2 27 09/19/2019     09/19/2019     Lab Results   Component Value Date    ALT 22 09/19/2019    AST 13 09/19/2019    ALKPHOS 60 09/19/2019       Lab Results   Component Value Date    WBC 4 60 09/19/2019    HGB 12 3 09/19/2019    HCT 38 3 09/19/2019    MCV 90 09/19/2019     09/19/2019     Lab Results   Component Value Date    TRIG 94 09/19/2019     Lab Results   Component Value Date    HDL 67 (H) 09/19/2019     Lab Results   Component Value Date    LDLCALC 113 (H) 09/19/2019     Lab Results   Component Value Date    HGBA1C 5 2 09/06/2017           ALLERGIES:  No Known Allergies    Current Medications     Current Outpatient Medications   Medication Sig Dispense Refill    Calcium Carbonate (CALCIUM 600 PO) Take 700 mg by mouth 2 (two) times a day       cholecalciferol (VITAMIN D3) 1,000 units tablet Take 1,000 Units by mouth daily       denosumab (PROLIA) 60 mg/mL Inject 1 mL (60 mg total) under the skin once for 1 dose 1 mL 1    esomeprazole (NEXIUM) 40 MG capsule Take 1 capsule by mouth daily      ibuprofen (MOTRIN) 200 mg tablet Take 200 mg by mouth every 6 (six) hours as needed for mild pain      NON FORMULARY CBD OIL CAPSULES      Omega-3 Fatty Acids (OMEGA-3 CF) 1000 MG CAPS Take by mouth      Resveratrol 250 MG CAPS Take by mouth daily      Turmeric (CURCUMIN 95 PO) Take 400 mg by mouth daily       No current facility-administered medications for this visit            Health Maintenance     Health Maintenance   Topic Date Due    MAMMOGRAM  02/15/2020    Fall Risk  08/23/2020    Depression Screening PHQ  08/23/2020    Urinary Incontinence Screening  08/23/2020    BMI: Followup Plan  08/23/2020    BMI: Adult  10/17/2020    DXA SCAN  09/04/2021    CRC Screening: Colonoscopy  08/13/2023    DTaP,Tdap,and Td Vaccines (2 - Td) 01/31/2027    Hepatitis C Screening  Completed    Influenza Vaccine  Completed    Pneumococcal Vaccine: 65+ Years  Completed    Pneumococcal Vaccine: Pediatrics (0 to 5 Years) and At-Risk Patients (6 to 59 Years)  Aged Out    HIB Vaccine  Aged Out    Hepatitis B Vaccine  Aged Out    IPV Vaccine  Aged Out    Hepatitis A Vaccine  Aged Out    Meningococcal ACWY Vaccine  Aged Out    HPV Vaccine  Aged [employer] History   Administered Date(s) Administered    H1N1, All Formulations 09/18/2014    Influenza Split High Dose Preservative Free IM 10/28/2015, 11/21/2016, 08/21/2017    Influenza TIV (IM) 12/26/2012    Influenza, high dose seasonal 0 5 mL 10/23/2018, 10/18/2019    Pneumococcal Conjugate 13-Valent 08/21/2017    Pneumococcal Polysaccharide PPV23 08/28/2018    Tdap 01/31/2017    Tuberculin Skin Test-PPD Intradermal 09/03/2013    Zoster 12/26/2012       Candelario Alvarez PA-C  1/9/2020 3:44 PM  [facility]

## 2020-01-09 ENCOUNTER — CONSULT (OUTPATIENT)
Dept: FAMILY MEDICINE CLINIC | Facility: CLINIC | Age: 70
End: 2020-01-09
Payer: COMMERCIAL

## 2020-01-09 VITALS
BODY MASS INDEX: 27.08 KG/M2 | SYSTOLIC BLOOD PRESSURE: 116 MMHG | HEART RATE: 72 BPM | WEIGHT: 147.13 LBS | HEIGHT: 62 IN | TEMPERATURE: 98.1 F | DIASTOLIC BLOOD PRESSURE: 80 MMHG | OXYGEN SATURATION: 97 %

## 2020-01-09 DIAGNOSIS — Z01.818 PRE-OP EXAMINATION: Primary | ICD-10-CM

## 2020-01-09 DIAGNOSIS — H26.9 CATARACT OF BOTH EYES, UNSPECIFIED CATARACT TYPE: ICD-10-CM

## 2020-01-09 PROBLEM — Z78.0 POSTMENOPAUSAL STATUS: Status: ACTIVE | Noted: 2020-01-09

## 2020-01-09 PROCEDURE — 99242 OFF/OP CONSLTJ NEW/EST SF 20: CPT | Performed by: PHYSICIAN ASSISTANT

## 2020-01-09 RX ORDER — IBUPROFEN 200 MG
200 TABLET ORAL EVERY 6 HOURS PRN
COMMUNITY

## 2020-02-21 ENCOUNTER — TRANSCRIBE ORDERS (OUTPATIENT)
Dept: ADMINISTRATIVE | Facility: HOSPITAL | Age: 70
End: 2020-02-21

## 2020-02-21 DIAGNOSIS — Z12.31 SCREENING MAMMOGRAM FOR HIGH-RISK PATIENT: Primary | ICD-10-CM

## 2020-04-22 ENCOUNTER — TELEMEDICINE (OUTPATIENT)
Dept: ENDOCRINOLOGY | Facility: CLINIC | Age: 70
End: 2020-04-22
Payer: COMMERCIAL

## 2020-04-22 ENCOUNTER — TELEPHONE (OUTPATIENT)
Dept: ENDOCRINOLOGY | Facility: CLINIC | Age: 70
End: 2020-04-22

## 2020-04-22 DIAGNOSIS — M81.0 OSTEOPOROSIS, UNSPECIFIED OSTEOPOROSIS TYPE, UNSPECIFIED PATHOLOGICAL FRACTURE PRESENCE: Primary | ICD-10-CM

## 2020-04-22 DIAGNOSIS — E55.9 VITAMIN D DEFICIENCY: ICD-10-CM

## 2020-04-22 PROCEDURE — 99441 PR PHYS/QHP TELEPHONE EVALUATION 5-10 MIN: CPT | Performed by: PHYSICIAN ASSISTANT

## 2020-04-22 RX ORDER — MELATONIN
2000 DAILY
Start: 2020-04-22 | End: 2020-08-24

## 2020-05-20 ENCOUNTER — TELEPHONE (OUTPATIENT)
Dept: ENDOCRINOLOGY | Facility: CLINIC | Age: 70
End: 2020-05-20

## 2020-05-21 ENCOUNTER — OFFICE VISIT (OUTPATIENT)
Dept: ENDOCRINOLOGY | Facility: CLINIC | Age: 70
End: 2020-05-21
Payer: COMMERCIAL

## 2020-05-21 DIAGNOSIS — M81.0 OSTEOPOROSIS, UNSPECIFIED OSTEOPOROSIS TYPE, UNSPECIFIED PATHOLOGICAL FRACTURE PRESENCE: ICD-10-CM

## 2020-05-21 DIAGNOSIS — M81.0 OSTEOPOROSIS, UNSPECIFIED OSTEOPOROSIS TYPE, UNSPECIFIED PATHOLOGICAL FRACTURE PRESENCE: Primary | ICD-10-CM

## 2020-05-21 PROCEDURE — 96372 THER/PROPH/DIAG INJ SC/IM: CPT | Performed by: INTERNAL MEDICINE

## 2020-06-11 ENCOUNTER — HOSPITAL ENCOUNTER (OUTPATIENT)
Dept: MAMMOGRAPHY | Facility: MEDICAL CENTER | Age: 70
Discharge: HOME | End: 2020-06-11
Payer: COMMERCIAL

## 2020-06-11 VITALS — BODY MASS INDEX: 27.05 KG/M2 | WEIGHT: 147 LBS | HEIGHT: 62 IN

## 2020-06-11 DIAGNOSIS — Z12.31 SCREENING MAMMOGRAM FOR HIGH-RISK PATIENT: ICD-10-CM

## 2020-06-11 PROCEDURE — 77067 SCR MAMMO BI INCL CAD: CPT

## 2020-06-11 PROCEDURE — 77063 BREAST TOMOSYNTHESIS BI: CPT

## 2020-08-23 NOTE — PROGRESS NOTES
ADULT ANNUAL [facility]    NAME: Deanna Easley  AGE: 69 y o  SEX: female  : 1950     DATE: 2020     Assessment and Plan:     Problem List Items Addressed This Visit        Digestive    Esophageal reflux     Stable and controlled  She will continue with Nexium 40 mg daily as directed by GI  Will continue to monitor  Musculoskeletal and Integument    Osteoporosis     Patient will continue to follow with endocrinology  She is currently treating with Prolia injections every 6 months  She remains on calcium as well as vitamin-D 3000 units daily  Will continue to monitor  Relevant Medications    cholecalciferol (VITAMIN D3) 1,000 units tablet    Other Relevant Orders    Comprehensive metabolic panel    Vitamin D 25 hydroxy    Generalized osteoarthritis     Stable  She will continue with orthopedics for knee injection intermittently  She will continue with turmeric as well as occasional use of ibuprofen when needed  Will continue to monitor  Other    Overweight     Patient states that she plans to work on improving her weight 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  She plans on increasing salad intake and decreasing snacking  She will continue to try to be as active as possible as well  BMI Counseling: Body mass index is 28 24 kg/m²  The BMI is above normal  Nutrition recommendations include consuming healthier snacks  Exercise recommendations include exercising 3-5 times per week  Relevant Orders    CBC and differential    Comprehensive metabolic panel    TSH, 3rd generation with Free T4 reflex    Hyperlipidemia     Last year, she had a mild elevation of her LDL but her calculated ASCVD risk was only 6%  Will recheck this year with lipid panel  She should continue to follow low-fat diet           Relevant Orders    Lipid Panel with Direct LDL reflex Vitamin D deficiency     Will recheck level with labs as ordered today  She notes that she was increase to 3000 units of vitamin-D by endocrinology previously  Relevant Medications    cholecalciferol (VITAMIN D3) 1,000 units tablet    Other Relevant Orders    CBC and differential    Comprehensive metabolic panel    Vitamin D 25 hydroxy      Other Visit Diagnoses     Annual physical exam    -  Primary    Relevant Orders    CBC and differential    Comprehensive metabolic panel    Lipid Panel with Direct LDL reflex    TSH, 3rd generation with Free T4 reflex    Vitamin D 25 hydroxy          Immunizations and preventive care screenings were discussed with patient today  Appropriate education was printed on patient's after visit summary  Counseling:  · Exercise: the importance of regular exercise/physical activity was discussed  Recommend exercise 3-5 times per week for at least 30 minutes  Return in about 1 year (around 8/24/2021) for Annual physical      Chief Complaint:     Chief Complaint   Patient presents with    Physical Exam      History of Present Illness:     Adult Annual Physical   Patient here for a comprehensive physical exam  The patient reports problems - as below  The patient reports that GERD has been well-controlled with Nexium 40 mg daily  She continues with GI every year  The patient has osteoporosis, which she uses Prolia injections for as well as calcium 1200 mg total daily and vitamin-D 2000 units daily  Her last DEXA scan was done 9/2019 which showed osteoporosis in the forearm and total hip areas along with an elevated 10 year risk of hip fracture at 3 1%  The patient is not currently on a statin  Last LDL was 113 in 9/2019 and had a calculated ASCVD risk of 6% then  The patient has a history of vitamin-D deficiency and is currently taking 3000 units daily  Last vitamin-D level was 26 4 in 9/2019  Since the patent's last visit, weight has decreased  Diet is reported to be fair - needs more salad and less snacks per her  Exercise occurs with walking and gardening  She reports that her chronic joint pain from osteoarthritis has been controlled with corticosteroid injections with Dr Wagner Shah  She is using ibuprofen as needed as well as turmeric  She feels that these are helpful  Diet and Physical Activity  · Diet/Nutrition: fair but could use less snacks and more salad per patient  · Exercise: as above - occurs with walking and gardening  Depression Screening  PHQ-9 Depression Screening    PHQ-9:    Frequency of the following problems over the past two weeks:       Little interest or pleasure in doing things:  0 - not at all  Feeling down, depressed, or hopeless:  0 - not at all  PHQ-2 Score:  0       General Health  · Sleep: sleeps well and gets 7-8 hours of sleep on average  · Hearing: normal - bilateral   · Vision: goes for regular eye exams and had cataract surgery in January 2020  · Dental: regular dental visits  /GYN Health  · Patient is: postmenopausal       Review of Systems:     Review of Systems   Constitutional: Negative for chills and fever  HENT: Negative for rhinorrhea and sore throat  Eyes: Negative for visual disturbance  Respiratory: Negative for cough, shortness of breath and wheezing  Cardiovascular: Negative for chest pain, palpitations and leg swelling  Gastrointestinal: Negative for abdominal pain, blood in stool, constipation, diarrhea, nausea and vomiting  Endocrine: Negative for polydipsia and polyuria  Genitourinary: Negative for dysuria and frequency  Musculoskeletal: Positive for arthralgias (knees and hands) and back pain  Negative for myalgias  Skin: Negative for rash  Has yearly skin exam   Neurological: Negative for dizziness, syncope and headaches  Hematological: Does not bruise/bleed easily  Psychiatric/Behavioral: Negative for dysphoric mood   The patient is not nervous/anxious  Past Medical History:     History reviewed  No pertinent past medical history     Past Surgical History:     Past Surgical History:   Procedure Laterality Date    CATARACT EXTRACTION, BILATERAL  01/2020    COLONOSCOPY      x2    PERIURETHRAL ABSCESS DRAINAGE  1979      Social History:        Social History     Socioeconomic History    Marital status: /Civil Union     Spouse name: None    Number of children: None    Years of education: None    Highest education level: None   Occupational History    None   Social Needs    Financial resource strain: None    Food insecurity     Worry: None     Inability: None    Transportation needs     Medical: None     Non-medical: None   Tobacco Use    Smoking status: Never Smoker    Smokeless tobacco: Never Used   Substance and Sexual Activity    Alcohol use: No    Drug use: Never    Sexual activity: None   Lifestyle    Physical activity     Days per week: None     Minutes per session: None    Stress: None   Relationships    Social connections     Talks on phone: None     Gets together: None     Attends Anabaptist service: None     Active member of club or organization: None     Attends meetings of clubs or organizations: None     Relationship status: None    Intimate partner violence     Fear of current or ex partner: None     Emotionally abused: None     Physically abused: None     Forced sexual activity: None   Other Topics Concern    None   Social History Narrative    None      Family History:     Family History   Problem Relation Age of Onset    Stroke Mother     Prostate cancer Father 79    Rheum arthritis Sister     Diabetes Brother     No Known Problems Maternal Grandmother     Lung cancer Maternal Grandfather     Stomach cancer Paternal Grandmother 80    Cancer Paternal Grandfather [de-identified]    Eczema Daughter       Current Medications:     Current Outpatient Medications   Medication Sig Dispense Refill    Calcium Carbonate (CALCIUM 600 PO) Take 700 mg by mouth 2 (two) times a day       cholecalciferol (VITAMIN D3) 1,000 units tablet Take 3 tablets (3,000 Units total) by mouth daily      denosumab (PROLIA) 60 mg/mL Inject 1 mL (60 mg total) under the skin once for 1 dose 1 mL 1    esomeprazole (NEXIUM) 40 MG capsule Take 1 capsule by mouth daily      ibuprofen (MOTRIN) 200 mg tablet Take 200 mg by mouth every 6 (six) hours as needed for mild pain      NON FORMULARY CBD OIL CAPSULES      NON FORMULARY Brain focus capsules      Omega-3 Fatty Acids (OMEGA-3 CF) 1000 MG CAPS Take by mouth      Resveratrol 250 MG CAPS Take by mouth daily      Turmeric (CURCUMIN 95 PO) Take 400 mg by mouth daily       No current facility-administered medications for this visit  Allergies:     No Known Allergies   Physical Exam:     /88 (BP Location: Left arm, Patient Position: Sitting, Cuff Size: Standard)   Pulse 88   Temp 98 °F (36 7 °C)   Ht 5' 2" (1 575 m)   Wt 70 kg (154 lb 6 oz)   SpO2 98%   BMI 28 24 kg/m²     Physical Exam  Vitals signs reviewed  Constitutional:       General: She is not in acute distress  Appearance: She is well-developed  HENT:      Head: Normocephalic and atraumatic  Right Ear: External ear normal       Left Ear: External ear normal       Nose: Nose normal       Mouth/Throat:      Pharynx: No oropharyngeal exudate  Eyes:      Conjunctiva/sclera: Conjunctivae normal       Pupils: Pupils are equal, round, and reactive to light  Neck:      Musculoskeletal: Normal range of motion and neck supple  Thyroid: No thyromegaly  Cardiovascular:      Rate and Rhythm: Normal rate and regular rhythm  Heart sounds: Normal heart sounds  No murmur  Pulmonary:      Effort: Pulmonary effort is normal       Breath sounds: Normal breath sounds  No wheezing or rales  Abdominal:      General: Bowel sounds are normal  There is no distension  Palpations: Abdomen is soft   There is no mass       Tenderness: There is no abdominal tenderness  Musculoskeletal:      Right lower leg: No edema  Left lower leg: No edema  Comments: Antalgic gait   Lymphadenopathy:      Cervical: No cervical adenopathy  Skin:     General: Skin is warm and dry  Findings: No rash  Neurological:      Mental Status: She is alert  Sensory: No sensory deficit  Psychiatric:         Mood and Affect: Mood normal          Behavior: Behavior normal          Thought Content:  Thought content normal           Joseph Todd PA-C  [facility]

## 2020-08-24 ENCOUNTER — OFFICE VISIT (OUTPATIENT)
Dept: FAMILY MEDICINE CLINIC | Facility: CLINIC | Age: 70
End: 2020-08-24
Payer: COMMERCIAL

## 2020-08-24 VITALS
WEIGHT: 154.38 LBS | OXYGEN SATURATION: 98 % | DIASTOLIC BLOOD PRESSURE: 88 MMHG | HEART RATE: 88 BPM | HEIGHT: 62 IN | SYSTOLIC BLOOD PRESSURE: 128 MMHG | TEMPERATURE: 98 F | BODY MASS INDEX: 28.41 KG/M2

## 2020-08-24 DIAGNOSIS — K21.9 GASTROESOPHAGEAL REFLUX DISEASE, ESOPHAGITIS PRESENCE NOT SPECIFIED: ICD-10-CM

## 2020-08-24 DIAGNOSIS — M81.0 OSTEOPOROSIS, UNSPECIFIED OSTEOPOROSIS TYPE, UNSPECIFIED PATHOLOGICAL FRACTURE PRESENCE: ICD-10-CM

## 2020-08-24 DIAGNOSIS — Z23 NEED FOR SHINGLES VACCINE: ICD-10-CM

## 2020-08-24 DIAGNOSIS — E78.5 HYPERLIPIDEMIA, UNSPECIFIED HYPERLIPIDEMIA TYPE: ICD-10-CM

## 2020-08-24 DIAGNOSIS — E55.9 VITAMIN D DEFICIENCY: ICD-10-CM

## 2020-08-24 DIAGNOSIS — E66.3 OVERWEIGHT: ICD-10-CM

## 2020-08-24 DIAGNOSIS — Z00.00 ANNUAL PHYSICAL EXAM: Primary | ICD-10-CM

## 2020-08-24 DIAGNOSIS — M15.9 GENERALIZED OSTEOARTHRITIS: ICD-10-CM

## 2020-08-24 PROCEDURE — 1101F PT FALLS ASSESS-DOCD LE1/YR: CPT | Performed by: PHYSICIAN ASSISTANT

## 2020-08-24 PROCEDURE — 3725F SCREEN DEPRESSION PERFORMED: CPT | Performed by: PHYSICIAN ASSISTANT

## 2020-08-24 PROCEDURE — 1160F RVW MEDS BY RX/DR IN RCRD: CPT | Performed by: PHYSICIAN ASSISTANT

## 2020-08-24 PROCEDURE — 90750 HZV VACC RECOMBINANT IM: CPT

## 2020-08-24 PROCEDURE — 3008F BODY MASS INDEX DOCD: CPT | Performed by: PHYSICIAN ASSISTANT

## 2020-08-24 PROCEDURE — 90471 IMMUNIZATION ADMIN: CPT

## 2020-08-24 PROCEDURE — 3288F FALL RISK ASSESSMENT DOCD: CPT | Performed by: PHYSICIAN ASSISTANT

## 2020-08-24 PROCEDURE — 99397 PER PM REEVAL EST PAT 65+ YR: CPT | Performed by: PHYSICIAN ASSISTANT

## 2020-08-24 PROCEDURE — 1036F TOBACCO NON-USER: CPT | Performed by: PHYSICIAN ASSISTANT

## 2020-08-24 RX ORDER — MELATONIN
3000 DAILY
Start: 2020-08-24

## 2020-08-24 NOTE — ASSESSMENT & PLAN NOTE
Will recheck level with labs as ordered today  She notes that she was increase to 3000 units of vitamin-D by endocrinology previously

## 2020-08-24 NOTE — ASSESSMENT & PLAN NOTE
Patient will continue to follow with endocrinology  She is currently treating with Prolia injections every 6 months  She remains on calcium as well as vitamin-D 3000 units daily  Will continue to monitor

## 2020-08-24 NOTE — ASSESSMENT & PLAN NOTE
Stable and controlled  She will continue with Nexium 40 mg daily as directed by GI  Will continue to monitor

## 2020-08-24 NOTE — ASSESSMENT & PLAN NOTE
Last year, she had a mild elevation of her LDL but her calculated ASCVD risk was only 6%  Will recheck this year with lipid panel  She should continue to follow low-fat diet

## 2020-08-24 NOTE — ASSESSMENT & PLAN NOTE
Stable  She will continue with orthopedics for knee injection intermittently  She will continue with turmeric as well as occasional use of ibuprofen when needed  Will continue to monitor

## 2020-08-24 NOTE — PATIENT INSTRUCTIONS
Wellness Visit for Adults   AMBULATORY CARE:   A wellness visit  is when you see your healthcare provider to get screened for health problems  You can also get advice on how to stay healthy  Write down your questions so you remember to ask them  Ask your healthcare provider how often you should have a wellness visit  What happens at a wellness visit:  Your healthcare provider will ask about your health, and your family history of health problems  This includes high blood pressure, heart disease, and cancer  He or she will ask if you have symptoms that concern you, if you smoke, and about your mood  You may also be asked about your intake of medicines, supplements, food, and alcohol  Any of the following may be done:  · Your weight  will be checked  Your height may also be checked so your body mass index (BMI) can be calculated  Your BMI shows if you are at a healthy weight  · Your blood pressure  and heart rate will be checked  Your temperature may also be checked  · Blood and urine tests  may be done  Blood tests may be done to check your cholesterol levels  Abnormal cholesterol levels increase your risk for heart disease and stroke  You may also need a blood or urine test to check for diabetes if you are at increased risk  Urine tests may be done to look for signs of an infection or kidney disease  · A physical exam  includes checking your heartbeat and lungs with a stethoscope  Your healthcare provider may also check your skin to look for sun damage  · Screening tests  may be recommended  A screening test is done to check for diseases that may not cause symptoms  The screening tests you may need depend on your age, gender, family history, and lifestyle habits  For example, colorectal screening may be recommended if you are 48years old or older  Screening tests you need if you are a woman:   · A Pap smear  is used to screen for cervical cancer   Pap smears are usually done every 3 to 5 years depending on your age  You may need them more often if you have had abnormal Pap smear test results in the past  Ask your healthcare provider how often you should have a Pap smear  · A mammogram  is an x-ray of your breasts to screen for breast cancer  Experts recommend mammograms every 2 years starting at age 48 years  You may need a mammogram at age 52 years or younger if you have an increased risk for breast cancer  Talk to your healthcare provider about when you should start having mammograms and how often you need them  Vaccines you may need:   · Get an influenza vaccine  every year  The influenza vaccine protects you from the flu  Several types of viruses cause the flu  The viruses change over time, so new vaccines are made each year  · Get a tetanus-diphtheria (Td) booster vaccine  every 10 years  This vaccine protects you against tetanus and diphtheria  Tetanus is a severe infection that may cause painful muscle spasms and lockjaw  Diphtheria is a severe bacterial infection that causes a thick covering in the back of your mouth and throat  · Get a human papillomavirus (HPV) vaccine  if you are female and aged 23 to 32 or male 23 to 24 and never received it  This vaccine protects you from HPV infection  HPV is the most common infection spread by sexual contact  HPV may also cause vaginal, penile, and anal cancers  · Get a pneumococcal vaccine  if you are aged 72 years or older  The pneumococcal vaccine is an injection given to protect you from pneumococcal disease  Pneumococcal disease is an infection caused by pneumococcal bacteria  The infection may cause pneumonia, meningitis, or an ear infection  · Get a shingles vaccine  if you are aged 61 or older, even if you have had shingles before  The shingles vaccine is an injection to protect you from the varicella-zoster virus  This is the same virus that causes chickenpox   Shingles is a painful rash that develops in people who had chickenpox or have been exposed to the virus  How to eat healthy:  My Plate is a model for planning healthy meals  It shows the types and amounts of foods that should go on your plate  Fruits and vegetables make up about half of your plate, and grains and protein make up the other half  A serving of dairy is included on the side of your plate  The amount of calories and serving sizes you need depends on your age, gender, weight, and height  Examples of healthy foods are listed below:  · Eat a variety of vegetables  such as dark green, red, and orange vegetables  You can also include canned vegetables low in sodium (salt) and frozen vegetables without added butter or sauces  · Eat a variety of fresh fruits , canned fruit in 100% juice, frozen fruit, and dried fruit  · Include whole grains  At least half of the grains you eat should be whole grains  Examples include whole-wheat bread, wheat pasta, brown rice, and whole-grain cereals such as oatmeal     · Eat a variety of protein foods such as seafood (fish and shellfish), lean meat, and poultry without skin (turkey and chicken)  Examples of lean meats include pork leg, shoulder, or tenderloin, and beef round, sirloin, tenderloin, and extra lean ground beef  Other protein foods include eggs and egg substitutes, beans, peas, soy products, nuts, and seeds  · Choose low-fat dairy products such as skim or 1% milk or low-fat yogurt, cheese, and cottage cheese  · Limit unhealthy fats  such as butter, hard margarine, and shortening  Exercise:  Exercise at least 30 minutes per day on most days of the week  Some examples of exercise include walking, biking, dancing, and swimming  You can also fit in more physical activity by taking the stairs instead of the elevator or parking farther away from stores  Include muscle strengthening activities 2 days each week  Regular exercise provides many health benefits   It helps you manage your weight, and decreases your risk for type 2 diabetes, heart disease, stroke, and high blood pressure  Exercise can also help improve your mood  Ask your healthcare provider about the best exercise plan for you  General health and safety guidelines:   · Do not smoke  Nicotine and other chemicals in cigarettes and cigars can cause lung damage  Ask your healthcare provider for information if you currently smoke and need help to quit  E-cigarettes or smokeless tobacco still contain nicotine  Talk to your healthcare provider before you use these products  · Limit alcohol  A drink of alcohol is 12 ounces of beer, 5 ounces of wine, or 1½ ounces of liquor  · Lose weight, if needed  Being overweight increases your risk of certain health conditions  These include heart disease, high blood pressure, type 2 diabetes, and certain types of cancer  · Protect your skin  Do not sunbathe or use tanning beds  Use sunscreen with a SPF 15 or higher  Apply sunscreen at least 15 minutes before you go outside  Reapply sunscreen every 2 hours  Wear protective clothing, hats, and sunglasses when you are outside  · Drive safely  Always wear your seatbelt  Make sure everyone in your car wears a seatbelt  A seatbelt can save your life if you are in an accident  Do not use your cell phone when you are driving  This could distract you and cause an accident  Pull over if you need to make a call or send a text message  · Practice safe sex  Use latex condoms if are sexually active and have more than one partner  Your healthcare provider may recommend screening tests for sexually transmitted infections (STIs)  · Wear helmets, lifejackets, and protective gear  Always wear a helmet when you ride a bike or motorcycle, go skiing, or play sports that could cause a head injury  Wear protective equipment when you play sports  Wear a lifejacket when you are on a boat or doing water sports    © 2017 [address] [employer] Information is for End User's use only and may not be sold, redistributed or otherwise used for commercial purposes  All illustrations and images included in CareNotes® are the copyrighted property of A [employer] M [employer] Inc  or [employer]  The above information is an  only  It is not intended as medical advice for individual conditions or treatments  Talk to your doctor, nurse or pharmacist before following any medical regimen to see if it is safe and effective for you  Esophageal reflux  Stable and controlled  She will continue with Nexium 40 mg daily as directed by GI  Will continue to monitor  Generalized osteoarthritis  Stable  She will continue with orthopedics for knee injection intermittently  She will continue with turmeric as well as occasional use of ibuprofen when needed  Will continue to monitor  Osteoporosis  Patient will continue to follow with endocrinology  She is currently treating with Prolia injections every 6 months  She remains on calcium as well as vitamin-D 3000 units daily  Will continue to monitor  Hyperlipidemia  Last year, she had a mild elevation of her LDL but her calculated ASCVD risk was only 6%  Will recheck this year with lipid panel  She should continue to follow low-fat diet  Overweight  Patient states that she plans to work on improving her weight 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

## 2020-08-24 NOTE — ASSESSMENT & PLAN NOTE
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

## 2020-09-17 ENCOUNTER — IMMUNIZATIONS (OUTPATIENT)
Dept: FAMILY MEDICINE CLINIC | Facility: CLINIC | Age: 70
End: 2020-09-17
Payer: COMMERCIAL

## 2020-09-17 DIAGNOSIS — Z23 FLU VACCINE NEED: Primary | ICD-10-CM

## 2020-09-17 PROCEDURE — 90662 IIV NO PRSV INCREASED AG IM: CPT

## 2020-09-17 PROCEDURE — 90471 IMMUNIZATION ADMIN: CPT

## 2020-09-24 ENCOUNTER — APPOINTMENT (OUTPATIENT)
Dept: LAB | Facility: MEDICAL CENTER | Age: 70
End: 2020-09-24
Payer: COMMERCIAL

## 2020-09-24 DIAGNOSIS — M81.0 OSTEOPOROSIS, UNSPECIFIED OSTEOPOROSIS TYPE, UNSPECIFIED PATHOLOGICAL FRACTURE PRESENCE: ICD-10-CM

## 2020-09-24 DIAGNOSIS — E66.3 OVERWEIGHT: ICD-10-CM

## 2020-09-24 DIAGNOSIS — E55.9 VITAMIN D DEFICIENCY: ICD-10-CM

## 2020-09-24 DIAGNOSIS — Z00.00 ANNUAL PHYSICAL EXAM: ICD-10-CM

## 2020-09-24 DIAGNOSIS — E78.5 HYPERLIPIDEMIA, UNSPECIFIED HYPERLIPIDEMIA TYPE: ICD-10-CM

## 2020-09-24 LAB
25(OH)D3 SERPL-MCNC: 40.6 NG/ML (ref 30–100)
ALBUMIN SERPL BCP-MCNC: 4.1 G/DL (ref 3.5–5)
ALP SERPL-CCNC: 36 U/L (ref 46–116)
ALT SERPL W P-5'-P-CCNC: 23 U/L (ref 12–78)
ANION GAP SERPL CALCULATED.3IONS-SCNC: 4 MMOL/L (ref 4–13)
AST SERPL W P-5'-P-CCNC: 11 U/L (ref 5–45)
BASOPHILS # BLD AUTO: 0.05 THOUSANDS/ΜL (ref 0–0.1)
BASOPHILS NFR BLD AUTO: 1 % (ref 0–1)
BILIRUB SERPL-MCNC: 1.4 MG/DL (ref 0.2–1)
BUN SERPL-MCNC: 17 MG/DL (ref 5–25)
CALCIUM SERPL-MCNC: 9.7 MG/DL (ref 8.3–10.1)
CHLORIDE SERPL-SCNC: 109 MMOL/L (ref 100–108)
CHOLEST SERPL-MCNC: 212 MG/DL (ref 50–200)
CO2 SERPL-SCNC: 28 MMOL/L (ref 21–32)
CREAT SERPL-MCNC: 0.7 MG/DL (ref 0.6–1.3)
EOSINOPHIL # BLD AUTO: 0.2 THOUSAND/ΜL (ref 0–0.61)
EOSINOPHIL NFR BLD AUTO: 4 % (ref 0–6)
ERYTHROCYTE [DISTWIDTH] IN BLOOD BY AUTOMATED COUNT: 13.7 % (ref 11.6–15.1)
GFR SERPL CREATININE-BSD FRML MDRD: 87 ML/MIN/1.73SQ M
GLUCOSE P FAST SERPL-MCNC: 92 MG/DL (ref 65–99)
HCT VFR BLD AUTO: 38.3 % (ref 34.8–46.1)
HDLC SERPL-MCNC: 68 MG/DL
HGB BLD-MCNC: 12.9 G/DL (ref 11.5–15.4)
IMM GRANULOCYTES # BLD AUTO: 0.01 THOUSAND/UL (ref 0–0.2)
IMM GRANULOCYTES NFR BLD AUTO: 0 % (ref 0–2)
LDLC SERPL CALC-MCNC: 122 MG/DL (ref 0–100)
LYMPHOCYTES # BLD AUTO: 1.38 THOUSANDS/ΜL (ref 0.6–4.47)
LYMPHOCYTES NFR BLD AUTO: 26 % (ref 14–44)
MCH RBC QN AUTO: 29.5 PG (ref 26.8–34.3)
MCHC RBC AUTO-ENTMCNC: 33.7 G/DL (ref 31.4–37.4)
MCV RBC AUTO: 87 FL (ref 82–98)
MONOCYTES # BLD AUTO: 0.29 THOUSAND/ΜL (ref 0.17–1.22)
MONOCYTES NFR BLD AUTO: 6 % (ref 4–12)
NEUTROPHILS # BLD AUTO: 3.35 THOUSANDS/ΜL (ref 1.85–7.62)
NEUTS SEG NFR BLD AUTO: 63 % (ref 43–75)
NRBC BLD AUTO-RTO: 0 /100 WBCS
PLATELET # BLD AUTO: 215 THOUSANDS/UL (ref 149–390)
PMV BLD AUTO: 9.2 FL (ref 8.9–12.7)
POTASSIUM SERPL-SCNC: 4.3 MMOL/L (ref 3.5–5.3)
PROT SERPL-MCNC: 6.8 G/DL (ref 6.4–8.2)
RBC # BLD AUTO: 4.38 MILLION/UL (ref 3.81–5.12)
SODIUM SERPL-SCNC: 141 MMOL/L (ref 136–145)
TRIGL SERPL-MCNC: 111 MG/DL
TSH SERPL DL<=0.05 MIU/L-ACNC: 3.03 UIU/ML (ref 0.36–3.74)
WBC # BLD AUTO: 5.28 THOUSAND/UL (ref 4.31–10.16)

## 2020-09-24 PROCEDURE — 85025 COMPLETE CBC W/AUTO DIFF WBC: CPT

## 2020-09-24 PROCEDURE — 36415 COLL VENOUS BLD VENIPUNCTURE: CPT

## 2020-09-24 PROCEDURE — 82306 VITAMIN D 25 HYDROXY: CPT

## 2020-09-24 PROCEDURE — 80061 LIPID PANEL: CPT

## 2020-09-24 PROCEDURE — 80053 COMPREHEN METABOLIC PANEL: CPT

## 2020-09-24 PROCEDURE — 84443 ASSAY THYROID STIM HORMONE: CPT

## 2020-10-13 DIAGNOSIS — E78.5 HYPERLIPIDEMIA, UNSPECIFIED HYPERLIPIDEMIA TYPE: Primary | ICD-10-CM

## 2020-10-26 ENCOUNTER — CLINICAL SUPPORT (OUTPATIENT)
Dept: FAMILY MEDICINE CLINIC | Facility: CLINIC | Age: 70
End: 2020-10-26
Payer: COMMERCIAL

## 2020-10-26 VITALS — TEMPERATURE: 97.2 F

## 2020-10-26 DIAGNOSIS — Z23 NEED FOR VACCINATION: Primary | ICD-10-CM

## 2020-10-26 PROCEDURE — 90471 IMMUNIZATION ADMIN: CPT

## 2020-10-26 PROCEDURE — 90750 HZV VACC RECOMBINANT IM: CPT

## 2020-10-30 ENCOUNTER — TELEPHONE (OUTPATIENT)
Dept: ENDOCRINOLOGY | Facility: CLINIC | Age: 70
End: 2020-10-30

## 2020-11-02 ENCOUNTER — TELEPHONE (OUTPATIENT)
Dept: ENDOCRINOLOGY | Facility: CLINIC | Age: 70
End: 2020-11-02

## 2020-11-10 ENCOUNTER — OFFICE VISIT (OUTPATIENT)
Dept: FAMILY MEDICINE CLINIC | Facility: CLINIC | Age: 70
End: 2020-11-10
Payer: COMMERCIAL

## 2020-11-10 VITALS
HEART RATE: 76 BPM | SYSTOLIC BLOOD PRESSURE: 120 MMHG | BODY MASS INDEX: 27.97 KG/M2 | WEIGHT: 152 LBS | RESPIRATION RATE: 18 BRPM | DIASTOLIC BLOOD PRESSURE: 80 MMHG | HEIGHT: 62 IN | TEMPERATURE: 98 F | OXYGEN SATURATION: 97 %
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
## 2020-11-10 PROCEDURE — 99212 OFFICE O/P EST SF 10 MIN: CPT | Performed by: INTERNAL MEDICINE

## 2020-11-10 PROCEDURE — 3725F SCREEN DEPRESSION PERFORMED: CPT | Performed by: INTERNAL MEDICINE

## 2020-11-24 ENCOUNTER — OFFICE VISIT (OUTPATIENT)
Dept: ENDOCRINOLOGY | Facility: CLINIC | Age: 70
End: 2020-11-24
Payer: COMMERCIAL

## 2020-11-24 VITALS
WEIGHT: 153 LBS | BODY MASS INDEX: 28.16 KG/M2 | HEART RATE: 64 BPM | SYSTOLIC BLOOD PRESSURE: 118 MMHG | DIASTOLIC BLOOD PRESSURE: 80 MMHG | HEIGHT: 62 IN

## 2020-11-24 DIAGNOSIS — M81.0 OSTEOPOROSIS, UNSPECIFIED OSTEOPOROSIS TYPE, UNSPECIFIED PATHOLOGICAL FRACTURE PRESENCE: Primary | ICD-10-CM

## 2020-11-24 DIAGNOSIS — E55.9 VITAMIN D DEFICIENCY: ICD-10-CM

## 2020-11-24 PROCEDURE — 1036F TOBACCO NON-USER: CPT | Performed by: NURSE PRACTITIONER

## 2020-11-24 PROCEDURE — 3008F BODY MASS INDEX DOCD: CPT | Performed by: NURSE PRACTITIONER

## 2020-11-24 PROCEDURE — 96372 THER/PROPH/DIAG INJ SC/IM: CPT | Performed by: NURSE PRACTITIONER

## 2020-11-24 PROCEDURE — 99213 OFFICE O/P EST LOW 20 MIN: CPT | Performed by: NURSE PRACTITIONER

## 2020-11-24 PROCEDURE — 1160F RVW MEDS BY RX/DR IN RCRD: CPT | Performed by: NURSE PRACTITIONER

## 2020-12-07 ENCOUNTER — LAB (OUTPATIENT)
Dept: LAB | Facility: MEDICAL CENTER | Age: 70
End: 2020-12-07
Payer: COMMERCIAL
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
## 2020-12-07 PROCEDURE — 36415 COLL VENOUS BLD VENIPUNCTURE: CPT

## 2020-12-07 PROCEDURE — 86618 LYME DISEASE ANTIBODY: CPT

## 2020-12-08 LAB — B BURGDOR IGG+IGM SER-ACNC: 29

## 2021-01-19 ENCOUNTER — LAB (OUTPATIENT)
Dept: LAB | Facility: MEDICAL CENTER | Age: 71
End: 2021-01-19
Payer: COMMERCIAL

## 2021-01-19 DIAGNOSIS — E78.5 HYPERLIPIDEMIA, UNSPECIFIED HYPERLIPIDEMIA TYPE: ICD-10-CM

## 2021-01-19 LAB
ALBUMIN SERPL BCP-MCNC: 4.4 G/DL (ref 3.5–5)
ALP SERPL-CCNC: 35 U/L (ref 46–116)
ALT SERPL W P-5'-P-CCNC: 24 U/L (ref 12–78)
ANION GAP SERPL CALCULATED.3IONS-SCNC: 2 MMOL/L (ref 4–13)
AST SERPL W P-5'-P-CCNC: 13 U/L (ref 5–45)
BILIRUB SERPL-MCNC: 1.29 MG/DL (ref 0.2–1)
BUN SERPL-MCNC: 18 MG/DL (ref 5–25)
CALCIUM ALBUM COR SERPL-MCNC: 9.1 MG/DL (ref 8.3–10.1)
CALCIUM SERPL-MCNC: 9.4 MG/DL (ref 8.3–10.1)
CHLORIDE SERPL-SCNC: 108 MMOL/L (ref 100–108)
CHOLEST SERPL-MCNC: 160 MG/DL (ref 50–200)
CO2 SERPL-SCNC: 30 MMOL/L (ref 21–32)
CREAT SERPL-MCNC: 0.74 MG/DL (ref 0.6–1.3)
GFR SERPL CREATININE-BSD FRML MDRD: 82 ML/MIN/1.73SQ M
GLUCOSE P FAST SERPL-MCNC: 93 MG/DL (ref 65–99)
HDLC SERPL-MCNC: 79 MG/DL
LDLC SERPL CALC-MCNC: 66 MG/DL (ref 0–100)
POTASSIUM SERPL-SCNC: 4.8 MMOL/L (ref 3.5–5.3)
PROT SERPL-MCNC: 7 G/DL (ref 6.4–8.2)
SODIUM SERPL-SCNC: 140 MMOL/L (ref 136–145)
TRIGL SERPL-MCNC: 77 MG/DL

## 2021-01-19 PROCEDURE — 80053 COMPREHEN METABOLIC PANEL: CPT

## 2021-01-19 PROCEDURE — 80061 LIPID PANEL: CPT

## 2021-01-19 PROCEDURE — 36415 COLL VENOUS BLD VENIPUNCTURE: CPT

## 2021-03-10 DIAGNOSIS — Z23 ENCOUNTER FOR IMMUNIZATION: ICD-10-CM

## 2021-05-17 ENCOUNTER — TRANSCRIBE ORDERS (OUTPATIENT)
Dept: ADMINISTRATIVE | Facility: HOSPITAL | Age: 71
End: 2021-05-17

## 2021-05-17 DIAGNOSIS — Z12.31 ENCOUNTER FOR SCREENING MAMMOGRAM FOR MALIGNANT NEOPLASM OF BREAST: Primary | ICD-10-CM

## 2021-05-25 ENCOUNTER — OFFICE VISIT (OUTPATIENT)
Dept: ENDOCRINOLOGY | Facility: CLINIC | Age: 71
End: 2021-05-25
Payer: COMMERCIAL

## 2021-05-25 ENCOUNTER — TELEPHONE (OUTPATIENT)
Dept: ENDOCRINOLOGY | Facility: CLINIC | Age: 71
End: 2021-05-25

## 2021-05-25 VITALS
WEIGHT: 154 LBS | HEART RATE: 66 BPM | DIASTOLIC BLOOD PRESSURE: 78 MMHG | BODY MASS INDEX: 28.34 KG/M2 | SYSTOLIC BLOOD PRESSURE: 116 MMHG | HEIGHT: 62 IN

## 2021-05-25 DIAGNOSIS — M81.0 AGE-RELATED OSTEOPOROSIS WITHOUT CURRENT PATHOLOGICAL FRACTURE: ICD-10-CM

## 2021-05-25 DIAGNOSIS — E55.9 VITAMIN D DEFICIENCY: Primary | ICD-10-CM

## 2021-05-25 PROCEDURE — 3008F BODY MASS INDEX DOCD: CPT | Performed by: NURSE PRACTITIONER

## 2021-05-25 PROCEDURE — 96372 THER/PROPH/DIAG INJ SC/IM: CPT | Performed by: NURSE PRACTITIONER

## 2021-05-25 PROCEDURE — 99214 OFFICE O/P EST MOD 30 MIN: CPT | Performed by: NURSE PRACTITIONER

## 2021-05-25 PROCEDURE — 1160F RVW MEDS BY RX/DR IN RCRD: CPT | Performed by: NURSE PRACTITIONER

## 2021-05-25 PROCEDURE — 1036F TOBACCO NON-USER: CPT | Performed by: NURSE PRACTITIONER

## 2021-05-25 NOTE — TELEPHONE ENCOUNTER
[employer] and spoke with Tom Paula  Confirmed current PA is active with 1 visit remaining       [facility]-[id number], [place] 11/24/2020 - 11/23/2021

## 2021-05-25 NOTE — PROGRESS NOTES
Established Patient Progress Note       Chief Complaint   Patient presents with    Osteoporosis        History of Present Illness:     Georgena Paget is a 69 y o  female with a history of osteoporosis and vitamin d deficiency  For the osteoporosis she is currently receiving Prolia injections every 6 months  She has been tolerating them well  In addition she is taking vitamin d 3,000 units and calcium supplementation plus yogurt, milk, cheese  She is very active  She denies any recent falls or fractures  Patient Active Problem List   Diagnosis    Osteoporosis    Joint pain, knee    Generalized osteoarthritis    Esophageal reflux    Elevated bilirubin    Midline cystocele    Overweight    Fatigue    Hyperlipidemia    Vitamin D deficiency    Postmenopausal status      History reviewed  No pertinent past medical history     Past Surgical History:   Procedure Laterality Date    CATARACT EXTRACTION, BILATERAL  01/2020    COLONOSCOPY      x2    PERIURETHRAL ABSCESS DRAINAGE  1979      Family History   Problem Relation Age of Onset    Stroke Mother     Prostate cancer Father 79   Jullie Liming Rheum arthritis Sister     Diabetes Brother     No Known Problems Maternal Grandmother     Lung cancer Maternal Grandfather     Stomach cancer Paternal Grandmother 80    Cancer Paternal Grandfather [de-identified]    Eczema Daughter      Social History     Tobacco Use    Smoking status: Never Smoker    Smokeless tobacco: Never Used   Substance Use Topics    Alcohol use: No     No Known Allergies    Current Outpatient Medications:     Calcium Carbonate (CALCIUM 600 PO), Take 700 mg by mouth 2 (two) times a day , Disp: , Rfl:     cholecalciferol (VITAMIN D3) 1,000 units tablet, Take 3 tablets (3,000 Units total) by mouth daily, Disp: , Rfl:     esomeprazole (NEXIUM) 40 MG capsule, Take 1 capsule by mouth daily, Disp: , Rfl:     ibuprofen (MOTRIN) 200 mg tablet, Take 200 mg by mouth every 6 (six) hours as needed for mild pain, Disp: , Rfl:     NON FORMULARY, CBD OIL CAPSULES, Disp: , Rfl:     NON FORMULARY, Brain focus capsules, Disp: , Rfl:     Omega-3 Fatty Acids (OMEGA-3 CF) 1000 MG CAPS, Take by mouth, Disp: , Rfl:     Resveratrol 250 MG CAPS, Take by mouth daily, Disp: , Rfl:     Turmeric (CURCUMIN 95 PO), Take 400 mg by mouth daily, Disp: , Rfl:     Current Facility-Administered Medications:     denosumab (PROLIA) subcutaneous injection 60 mg, 60 mg, Subcutaneous, Q6 Months, Robin Ramos MD, 60 mg at 05/25/21 0946    Review of Systems   Constitutional: Negative for chills and fever  HENT: Negative for sore throat and trouble swallowing  Eyes: Negative for visual disturbance  Respiratory: Negative for shortness of breath  Cardiovascular: Negative for chest pain and palpitations  Gastrointestinal: Negative for abdominal pain, constipation and diarrhea  Endocrine: Negative for cold intolerance, heat intolerance, polydipsia, polyphagia and polyuria  Genitourinary: Negative for frequency  Musculoskeletal: Negative for arthralgias and myalgias  Skin: Negative for rash  Neurological: Negative for dizziness and tremors  Hematological: Negative for adenopathy  Psychiatric/Behavioral: Negative for sleep disturbance  All other systems reviewed and are negative  Physical Exam:  Body mass index is 28 17 kg/m²  /78   Pulse 66   Ht 5' 2" (1 575 m)   Wt 69 9 kg (154 lb)   BMI 28 17 kg/m²    Wt Readings from Last 3 Encounters:   05/25/21 69 9 kg (154 lb)   11/24/20 69 4 kg (153 lb)   11/10/20 68 9 kg (152 lb)       Physical Exam  Vitals signs reviewed  Constitutional:       General: She is not in acute distress  Appearance: She is well-developed  HENT:      Head: Normocephalic and atraumatic  Eyes:      Conjunctiva/sclera: Conjunctivae normal       Pupils: Pupils are equal, round, and reactive to light  Neck:      Musculoskeletal: Normal range of motion and neck supple  Thyroid: No thyromegaly  Cardiovascular:      Rate and Rhythm: Normal rate and regular rhythm  Heart sounds: Normal heart sounds  Pulmonary:      Effort: Pulmonary effort is normal  No respiratory distress  Breath sounds: Normal breath sounds  No wheezing or rales  Abdominal:      General: Bowel sounds are normal  There is no distension  Palpations: Abdomen is soft  Tenderness: There is no abdominal tenderness  Musculoskeletal: Normal range of motion  Skin:     General: Skin is warm and dry  Neurological:      Mental Status: She is alert and oriented to person, place, and time  Labs:     Lab Results   Component Value Date    SODIUM 140 01/19/2021    K 4 8 01/19/2021     01/19/2021    CO2 30 01/19/2021    AGAP 2 (L) 01/19/2021    BUN 18 01/19/2021    CREATININE 0 74 01/19/2021    GLUC 91 10/17/2018    GLUF 93 01/19/2021    CALCIUM 9 4 01/19/2021    AST 13 01/19/2021    ALT 24 01/19/2021    ALKPHOS 35 (L) 01/19/2021    TP 7 0 01/19/2021    TBILI 1 29 (H) 01/19/2021    EGFR 82 01/19/2021         Impression & Plan:    Problem List Items Addressed This Visit        Musculoskeletal and Integument    Osteoporosis     Prolia received today  Next Prolia scheduled for November  Continue calcium and vitamin d supplementation  PCP provided script for DEXA in September             Other    Vitamin D deficiency - Primary     Continue vitamin d supplementation                    Discussed with the patient and all questioned fully answered  She will call me if any problems arise  Follow-up appointment in 6 months       Counseled patient on diagnostic results, prognosis, risk and benefit of treatment options, instruction for management, importance of treatment compliance, Risk  factor reduction and impressions      [facility] [address]

## 2021-05-25 NOTE — ASSESSMENT & PLAN NOTE
Prolia received today  Next Prolia scheduled for November  Continue calcium and vitamin d supplementation   PCP provided script for DEXA in September

## 2021-05-25 NOTE — TELEPHONE ENCOUNTER
Verification received  PA required and on file  No OOP cost once yearly deductible is met  Pt aware of Prolia copay program  Provided her with phone number to call and explained how copay card works

## 2021-06-15 ENCOUNTER — HOSPITAL ENCOUNTER (OUTPATIENT)
Dept: MAMMOGRAPHY | Facility: MEDICAL CENTER | Age: 71
Discharge: HOME | End: 2021-06-15
Payer: COMMERCIAL

## 2021-06-15 VITALS — HEIGHT: 62 IN | BODY MASS INDEX: 28.34 KG/M2 | WEIGHT: 154 LBS

## 2021-06-15 DIAGNOSIS — Z12.31 ENCOUNTER FOR SCREENING MAMMOGRAM FOR MALIGNANT NEOPLASM OF BREAST: ICD-10-CM

## 2021-06-15 PROCEDURE — 77067 SCR MAMMO BI INCL CAD: CPT

## 2021-06-15 PROCEDURE — 77063 BREAST TOMOSYNTHESIS BI: CPT

## 2021-08-17 ENCOUNTER — RA CDI HCC (OUTPATIENT)
Dept: OTHER | Facility: HOSPITAL | Age: 71
End: 2021-08-17

## 2021-08-17 NOTE — PROGRESS NOTES
[facility]  coding opportunities       Chart reviewed, no opportunity found: CHART REVIEWED, NO OPPORTUNITY FOUND                        Patients insurance company: [employer] (Medicare Advantage and Commercial)

## 2021-08-22 NOTE — PROGRESS NOTES
ADULT ANNUAL [facility]    NAME: Ronda Mcgregor  AGE: 66 y o  SEX: female  : 1950     DATE: 2021     Assessment and Plan:     Problem List Items Addressed This Visit        Digestive    Esophageal reflux     Well-controlled  Continue Nexium 40 mg daily as per GI  Musculoskeletal and Integument    Generalized osteoarthritis     Stable  Continue with Ortho for knee injections and continue with turmeric plus ibuprofen PRN  Other    Overweight     BMI Counseling: Body mass index is 27 69 kg/m²  The BMI is above normal  Nutrition recommendations include 3-5 servings of fruits/vegetables daily  Exercise recommendations include moderate aerobic physical activity for 150 minutes/week and exercising 3-5 times per week  Relevant Orders    CBC and differential    Comprehensive metabolic panel    Lipid Panel with Direct LDL reflex    TSH, 3rd generation with Free T4 reflex    Vitamin D 25 hydroxy    Hyperlipidemia     Patient want to continue red yeast rice  Last LDL was 66 in 2021  Recheck with labs as ordered  Relevant Orders    Lipid Panel with Direct LDL reflex    Vitamin D deficiency     Continue with vitamin D 3000 units daily  Last level was 40 6  Recheck with next labs  Relevant Orders    Vitamin D 25 hydroxy      Other Visit Diagnoses     Annual physical exam    -  Primary    Relevant Orders    CBC and differential    Comprehensive metabolic panel    Lipid Panel with Direct LDL reflex    TSH, 3rd generation with Free T4 reflex    Vitamin D 25 hydroxy    Diabetes mellitus screening        Relevant Orders    Comprehensive metabolic panel          Immunizations and preventive care screenings were discussed with patient today  Appropriate education was printed on patient's after visit summary      Counseling:  · Exercise: the importance of regular exercise/physical activity was discussed  Recommend exercise 3-5 times per week for at least 30 minutes  Return in 1 year (on 8/25/2022) for Annual physical      Chief Complaint:     Chief Complaint   Patient presents with    Physical Exam      History of Present Illness:     Adult Annual Physical   Patient here for a comprehensive physical exam  The patient reports problems - as below  The patient reports that GERD has been well-controlled with Nexium 40 mg  daily as per GI  For her osteoporosis, patient does continue with Endo for Prolia injections every 6 months  Last DEXA was in 9/2019  She reports her OA has been stable with Ortho knee injections every 3 months and turmeric plus PRN ibuprofen  She notes that her back is worsening and will be seeing Spine specialist at [address] [facility] soon for that  The patient is not on a statin - but is on red yeast rice instead  Last LDL was 66 in 1/2021  The patient denies myalgias  The patient has a history of vitamin-D deficiency and is currently taking 3000 units daily  Last vitamin-D level was 40 6  Diet and Physical Activity  · Diet/Nutrition: well balanced diet and consuming 3-5 servings of fruits/vegetables daily  · Exercise: walking  She has not in the heat but usually does 4 times a week  Lately she has been gardening and doing housework  Depression Screening  PHQ-9 Depression Screening    PHQ-9:   Frequency of the following problems over the past two weeks:      Little interest or pleasure in doing things: 0 - not at all  Feeling down, depressed, or hopeless: 0 - not at all  PHQ-2 Score: 0       General Health  · Sleep: sleeps well and about 6-7 hours - feels rested upon waking  · Hearing: normal - bilateral   · Vision: goes for regular eye exams and most recent eye exam <1 year ago  · Dental: regular dental visits         /GYN Health  · Patient is: postmenopausal  · Last menstrual period: around 47-54 years old       Review of Systems:     Review of Systems   Constitutional: Negative for chills and fever  HENT: Positive for congestion (slight from allergies)  Negative for rhinorrhea and sore throat  Eyes: Negative for visual disturbance  Respiratory: Negative for cough, shortness of breath and wheezing  Cardiovascular: Negative for chest pain, palpitations and leg swelling  Gastrointestinal: Negative for abdominal pain, blood in stool, constipation, diarrhea, nausea and vomiting  Endocrine: Negative for polydipsia and polyuria  Genitourinary: Negative for dysuria and frequency  Musculoskeletal: Positive for arthralgias (knees and hands) and back pain  Negative for myalgias  Skin: Negative for rash  Neurological: Negative for dizziness, syncope and headaches  Hematological: Does not bruise/bleed easily  Psychiatric/Behavioral: Negative for dysphoric mood  The patient is nervous/anxious  Past Medical History:     History reviewed  No pertinent past medical history     Past Surgical History:     Past Surgical History:   Procedure Laterality Date    CATARACT EXTRACTION, BILATERAL  01/2020    COLONOSCOPY      x2    PERIURETHRAL ABSCESS DRAINAGE  1979      Social History:     Social History     Socioeconomic History    Marital status: /Civil Union     Spouse name: None    Number of children: None    Years of education: None    Highest education level: None   Occupational History    None   Tobacco Use    Smoking status: Never Smoker    Smokeless tobacco: Never Used   Vaping Use    Vaping Use: Never used   Substance and Sexual Activity    Alcohol use: No    Drug use: Never    Sexual activity: None   Other Topics Concern    None   Social History Narrative    None     Social Determinants of Health     Financial Resource Strain:     Difficulty of Paying Living Expenses:    Food Insecurity:     Worried About Running Out of Food in the Last Year:     Chaim of Food in the Last Year:    Transportation Needs:     Lack of Transportation (Medical):      Lack of Transportation (Non-Medical):    Physical Activity:     Days of Exercise per Week:     Minutes of Exercise per Session:    Stress:     Feeling of Stress :    Social Connections:     Frequency of Communication with Friends and Family:     Frequency of Social Gatherings with Friends and Family:     Attends [employer] Services:     Active Member of Clubs or Organizations:     Attends Club or Organization Meetings:     Marital Status:    Intimate Partner Violence:     Fear of Current or Ex-Partner:     Emotionally Abused:     Physically Abused:     Sexually Abused:       Family History:     Family History   Problem Relation Age of Onset    Stroke Mother     Prostate cancer Father 79    Rheum arthritis Sister     Diabetes Brother     Hypertension Son     Eczema Daughter     No Known Problems Maternal Grandmother     Lung cancer Maternal Grandfather     Stomach cancer Paternal Grandmother 80    Cancer Paternal Grandfather [de-identified]      Current Medications:     Current Outpatient Medications   Medication Sig Dispense Refill    esomeprazole (NEXIUM) 40 MG capsule Take 1 capsule by mouth daily      ibuprofen (MOTRIN) 200 mg tablet Take 200 mg by mouth every 6 (six) hours as needed for mild pain      MILK THISTLE PO Take by mouth      NON FORMULARY CBD OIL CAPSULES      NON FORMULARY Brain focus capsules      NON FORMULARY Core Immune Zinc-Vitamin C      Omega-3 Fatty Acids (OMEGA-3 CF) 1000 MG CAPS Take by mouth      Turmeric (CURCUMIN 95 PO) Take 400 mg by mouth daily      Calcium Carbonate (CALCIUM 600 PO) Take 700 mg by mouth 2 (two) times a day       cholecalciferol (VITAMIN D3) 1,000 units tablet Take 3 tablets (3,000 Units total) by mouth daily       Current Facility-Administered Medications   Medication Dose Route Frequency Provider Last Rate Last Admin    denosumab (PROLIA) subcutaneous injection 60 mg  60 mg Subcutaneous Q6 Months Spring Dejesus MD 60 mg at 05/25/21 0946      Allergies:     No Known Allergies   Physical Exam:     /80   Pulse 70   Temp (!) 97 2 °F (36 2 °C) (Temporal)   Ht 5' 2" (1 575 m)   Wt 68 7 kg (151 lb 6 4 oz)   SpO2 98%   BMI 27 69 kg/m²     Physical Exam  Vitals reviewed  Constitutional:       General: She is not in acute distress  Appearance: Normal appearance  She is well-developed  She is not ill-appearing  HENT:      Head: Normocephalic and atraumatic  Right Ear: Tympanic membrane, ear canal and external ear normal       Left Ear: Tympanic membrane, ear canal and external ear normal       Nose: Nose normal    Eyes:      Conjunctiva/sclera: Conjunctivae normal       Pupils: Pupils are equal, round, and reactive to light  Neck:      Thyroid: No thyromegaly  Vascular: No carotid bruit  Cardiovascular:      Rate and Rhythm: Normal rate and regular rhythm  Pulses: Normal pulses  Heart sounds: Normal heart sounds  No murmur heard  Pulmonary:      Effort: Pulmonary effort is normal       Breath sounds: Normal breath sounds  No wheezing, rhonchi or rales  Abdominal:      General: Bowel sounds are normal  There is no distension  Palpations: Abdomen is soft  There is no mass  Tenderness: There is no abdominal tenderness  Musculoskeletal:      Cervical back: Normal range of motion and neck supple  Right lower leg: No edema  Left lower leg: No edema  Lymphadenopathy:      Cervical: No cervical adenopathy  Skin:     General: Skin is warm and dry  Findings: No rash  Neurological:      Mental Status: She is alert  Sensory: No sensory deficit  Comments: 5/5 strength in UE and LE   Psychiatric:         Mood and Affect: Mood normal          Behavior: Behavior normal          Thought Content:  Thought content normal          Judgment: Judgment normal           Geronimo Deleon PA-C  [facility]

## 2021-08-25 ENCOUNTER — OFFICE VISIT (OUTPATIENT)
Dept: FAMILY MEDICINE CLINIC | Facility: CLINIC | Age: 71
End: 2021-08-25
Payer: COMMERCIAL

## 2021-08-25 VITALS
HEIGHT: 62 IN | HEART RATE: 70 BPM | BODY MASS INDEX: 27.86 KG/M2 | SYSTOLIC BLOOD PRESSURE: 122 MMHG | OXYGEN SATURATION: 98 % | DIASTOLIC BLOOD PRESSURE: 80 MMHG | WEIGHT: 151.4 LBS | TEMPERATURE: 97.2 F

## 2021-08-25 DIAGNOSIS — K21.9 GASTROESOPHAGEAL REFLUX DISEASE, UNSPECIFIED WHETHER ESOPHAGITIS PRESENT: ICD-10-CM

## 2021-08-25 DIAGNOSIS — E66.3 OVERWEIGHT: ICD-10-CM

## 2021-08-25 DIAGNOSIS — M15.9 GENERALIZED OSTEOARTHRITIS: ICD-10-CM

## 2021-08-25 DIAGNOSIS — E78.5 HYPERLIPIDEMIA, UNSPECIFIED HYPERLIPIDEMIA TYPE: ICD-10-CM

## 2021-08-25 DIAGNOSIS — E55.9 VITAMIN D DEFICIENCY: ICD-10-CM

## 2021-08-25 DIAGNOSIS — Z13.1 DIABETES MELLITUS SCREENING: ICD-10-CM

## 2021-08-25 DIAGNOSIS — Z00.00 ANNUAL PHYSICAL EXAM: Primary | ICD-10-CM

## 2021-08-25 PROCEDURE — 1101F PT FALLS ASSESS-DOCD LE1/YR: CPT | Performed by: PHYSICIAN ASSISTANT

## 2021-08-25 PROCEDURE — 3288F FALL RISK ASSESSMENT DOCD: CPT | Performed by: PHYSICIAN ASSISTANT

## 2021-08-25 PROCEDURE — 1036F TOBACCO NON-USER: CPT | Performed by: PHYSICIAN ASSISTANT

## 2021-08-25 PROCEDURE — 1160F RVW MEDS BY RX/DR IN RCRD: CPT | Performed by: PHYSICIAN ASSISTANT

## 2021-08-25 PROCEDURE — 99397 PER PM REEVAL EST PAT 65+ YR: CPT | Performed by: PHYSICIAN ASSISTANT

## 2021-08-25 PROCEDURE — 3008F BODY MASS INDEX DOCD: CPT | Performed by: PHYSICIAN ASSISTANT

## 2021-08-25 PROCEDURE — 3725F SCREEN DEPRESSION PERFORMED: CPT | Performed by: PHYSICIAN ASSISTANT

## 2021-08-25 NOTE — PATIENT INSTRUCTIONS

## 2021-08-26 PROBLEM — R53.83 FATIGUE: Status: RESOLVED | Noted: 2019-08-23 | Resolved: 2021-08-26

## 2021-08-26 NOTE — ASSESSMENT & PLAN NOTE
BMI Counseling: Body mass index is 27 69 kg/m²  The BMI is above normal  Nutrition recommendations include 3-5 servings of fruits/vegetables daily  Exercise recommendations include moderate aerobic physical activity for 150 minutes/week and exercising 3-5 times per week

## 2021-08-26 NOTE — ASSESSMENT & PLAN NOTE
Patient was encouraged to continue with Endo for Prolia every 6 months  Given slip for next DEXA (due next month)

## 2021-09-15 ENCOUNTER — HOSPITAL ENCOUNTER (OUTPATIENT)
Dept: BONE DENSITY | Facility: MEDICAL CENTER | Age: 71
Discharge: HOME | End: 2021-09-15
Payer: COMMERCIAL

## 2021-09-15 DIAGNOSIS — M81.0 OSTEOPOROSIS, UNSPECIFIED OSTEOPOROSIS TYPE, UNSPECIFIED PATHOLOGICAL FRACTURE PRESENCE: ICD-10-CM

## 2021-09-15 PROCEDURE — 77080 DXA BONE DENSITY AXIAL: CPT

## 2021-10-20 ENCOUNTER — IMMUNIZATIONS (OUTPATIENT)
Dept: FAMILY MEDICINE CLINIC | Facility: CLINIC | Age: 71
End: 2021-10-20
Payer: COMMERCIAL

## 2021-10-20 DIAGNOSIS — Z23 NEED FOR INFLUENZA VACCINATION: Primary | ICD-10-CM

## 2021-10-20 PROCEDURE — 90471 IMMUNIZATION ADMIN: CPT

## 2021-10-20 PROCEDURE — 90662 IIV NO PRSV INCREASED AG IM: CPT

## 2021-11-03 ENCOUNTER — APPOINTMENT (OUTPATIENT)
Dept: LAB | Facility: MEDICAL CENTER | Age: 71
End: 2021-11-03
Payer: COMMERCIAL

## 2021-11-03 DIAGNOSIS — Z13.1 DIABETES MELLITUS SCREENING: ICD-10-CM

## 2021-11-03 DIAGNOSIS — E55.9 VITAMIN D DEFICIENCY: ICD-10-CM

## 2021-11-03 DIAGNOSIS — E78.5 HYPERLIPIDEMIA, UNSPECIFIED HYPERLIPIDEMIA TYPE: ICD-10-CM

## 2021-11-03 DIAGNOSIS — E66.3 OVERWEIGHT: ICD-10-CM

## 2021-11-03 DIAGNOSIS — Z00.00 ANNUAL PHYSICAL EXAM: ICD-10-CM

## 2021-11-03 LAB
25(OH)D3 SERPL-MCNC: 58.6 NG/ML (ref 30–100)
ALBUMIN SERPL BCP-MCNC: 3.9 G/DL (ref 3.5–5)
ALP SERPL-CCNC: 37 U/L (ref 46–116)
ALT SERPL W P-5'-P-CCNC: 17 U/L (ref 12–78)
ANION GAP SERPL CALCULATED.3IONS-SCNC: 2 MMOL/L (ref 4–13)
AST SERPL W P-5'-P-CCNC: 11 U/L (ref 5–45)
BASOPHILS # BLD AUTO: 0.05 THOUSANDS/ΜL (ref 0–0.1)
BASOPHILS NFR BLD AUTO: 1 % (ref 0–1)
BILIRUB SERPL-MCNC: 1.38 MG/DL (ref 0.2–1)
BUN SERPL-MCNC: 18 MG/DL (ref 5–25)
CALCIUM SERPL-MCNC: 9.9 MG/DL (ref 8.3–10.1)
CHLORIDE SERPL-SCNC: 108 MMOL/L (ref 100–108)
CHOLEST SERPL-MCNC: 174 MG/DL (ref 50–200)
CO2 SERPL-SCNC: 29 MMOL/L (ref 21–32)
CREAT SERPL-MCNC: 0.77 MG/DL (ref 0.6–1.3)
EOSINOPHIL # BLD AUTO: 0.09 THOUSAND/ΜL (ref 0–0.61)
EOSINOPHIL NFR BLD AUTO: 2 % (ref 0–6)
ERYTHROCYTE [DISTWIDTH] IN BLOOD BY AUTOMATED COUNT: 13.9 % (ref 11.6–15.1)
GFR SERPL CREATININE-BSD FRML MDRD: 77 ML/MIN/1.73SQ M
GLUCOSE P FAST SERPL-MCNC: 94 MG/DL (ref 65–99)
HCT VFR BLD AUTO: 40.3 % (ref 34.8–46.1)
HDLC SERPL-MCNC: 73 MG/DL
HGB BLD-MCNC: 13.3 G/DL (ref 11.5–15.4)
IMM GRANULOCYTES # BLD AUTO: 0 THOUSAND/UL (ref 0–0.2)
IMM GRANULOCYTES NFR BLD AUTO: 0 % (ref 0–2)
LDLC SERPL CALC-MCNC: 83 MG/DL (ref 0–100)
LYMPHOCYTES # BLD AUTO: 1.05 THOUSANDS/ΜL (ref 0.6–4.47)
LYMPHOCYTES NFR BLD AUTO: 24 % (ref 14–44)
MCH RBC QN AUTO: 28.9 PG (ref 26.8–34.3)
MCHC RBC AUTO-ENTMCNC: 33 G/DL (ref 31.4–37.4)
MCV RBC AUTO: 88 FL (ref 82–98)
MONOCYTES # BLD AUTO: 0.3 THOUSAND/ΜL (ref 0.17–1.22)
MONOCYTES NFR BLD AUTO: 7 % (ref 4–12)
NEUTROPHILS # BLD AUTO: 2.83 THOUSANDS/ΜL (ref 1.85–7.62)
NEUTS SEG NFR BLD AUTO: 66 % (ref 43–75)
NRBC BLD AUTO-RTO: 0 /100 WBCS
PLATELET # BLD AUTO: 199 THOUSANDS/UL (ref 149–390)
PMV BLD AUTO: 9.2 FL (ref 8.9–12.7)
POTASSIUM SERPL-SCNC: 4.5 MMOL/L (ref 3.5–5.3)
PROT SERPL-MCNC: 7.2 G/DL (ref 6.4–8.2)
RBC # BLD AUTO: 4.6 MILLION/UL (ref 3.81–5.12)
SODIUM SERPL-SCNC: 139 MMOL/L (ref 136–145)
TRIGL SERPL-MCNC: 92 MG/DL
TSH SERPL DL<=0.05 MIU/L-ACNC: 2.09 UIU/ML (ref 0.36–3.74)
WBC # BLD AUTO: 4.32 THOUSAND/UL (ref 4.31–10.16)

## 2021-11-03 PROCEDURE — 82306 VITAMIN D 25 HYDROXY: CPT

## 2021-11-03 PROCEDURE — 85025 COMPLETE CBC W/AUTO DIFF WBC: CPT

## 2021-11-03 PROCEDURE — 36415 COLL VENOUS BLD VENIPUNCTURE: CPT

## 2021-11-03 PROCEDURE — 84443 ASSAY THYROID STIM HORMONE: CPT

## 2021-11-03 PROCEDURE — 80053 COMPREHEN METABOLIC PANEL: CPT

## 2021-11-03 PROCEDURE — 80061 LIPID PANEL: CPT

## 2021-11-30 ENCOUNTER — OFFICE VISIT (OUTPATIENT)
Dept: ENDOCRINOLOGY | Facility: CLINIC | Age: 71
End: 2021-11-30
Payer: COMMERCIAL

## 2021-11-30 ENCOUNTER — TELEPHONE (OUTPATIENT)
Dept: ENDOCRINOLOGY | Facility: CLINIC | Age: 71
End: 2021-11-30

## 2021-11-30 VITALS
DIASTOLIC BLOOD PRESSURE: 80 MMHG | BODY MASS INDEX: 27.79 KG/M2 | HEIGHT: 62 IN | WEIGHT: 151 LBS | HEART RATE: 81 BPM | SYSTOLIC BLOOD PRESSURE: 118 MMHG

## 2021-11-30 DIAGNOSIS — M81.0 AGE-RELATED OSTEOPOROSIS WITHOUT CURRENT PATHOLOGICAL FRACTURE: Primary | ICD-10-CM

## 2021-11-30 DIAGNOSIS — E55.9 VITAMIN D DEFICIENCY: ICD-10-CM

## 2021-11-30 PROCEDURE — 99214 OFFICE O/P EST MOD 30 MIN: CPT | Performed by: NURSE PRACTITIONER

## 2021-12-01 ENCOUNTER — CLINICAL SUPPORT (OUTPATIENT)
Dept: ENDOCRINOLOGY | Facility: CLINIC | Age: 71
End: 2021-12-01
Payer: COMMERCIAL

## 2021-12-01 DIAGNOSIS — M81.0 AGE-RELATED OSTEOPOROSIS WITHOUT CURRENT PATHOLOGICAL FRACTURE: ICD-10-CM

## 2021-12-01 PROCEDURE — 96372 THER/PROPH/DIAG INJ SC/IM: CPT | Performed by: INTERNAL MEDICINE

## 2022-04-18 ENCOUNTER — TELEPHONE (OUTPATIENT)
Dept: ENDOCRINOLOGY | Facility: CLINIC | Age: 72
End: 2022-04-18

## 2022-04-18 NOTE — TELEPHONE ENCOUNTER
Active PA, PA # [id number], Exp 11/29/2022, Quantity # 1 visit remaining    Insurance verification submitted for year 2022

## 2022-04-21 NOTE — TELEPHONE ENCOUNTER
Verification received  PA required and on file  No OOP cost once yearly deductible is met  Pt has commercial insurance and qualifies for copay card

## 2022-06-03 ENCOUNTER — OFFICE VISIT (OUTPATIENT)
Dept: ENDOCRINOLOGY | Facility: CLINIC | Age: 72
End: 2022-06-03
Payer: COMMERCIAL

## 2022-06-03 VITALS
SYSTOLIC BLOOD PRESSURE: 102 MMHG | HEART RATE: 74 BPM | BODY MASS INDEX: 27.6 KG/M2 | WEIGHT: 150 LBS | HEIGHT: 62 IN | DIASTOLIC BLOOD PRESSURE: 74 MMHG

## 2022-06-03 DIAGNOSIS — E04.1 THYROID NODULE: ICD-10-CM

## 2022-06-03 DIAGNOSIS — E55.9 VITAMIN D DEFICIENCY: ICD-10-CM

## 2022-06-03 DIAGNOSIS — M81.0 AGE-RELATED OSTEOPOROSIS WITHOUT CURRENT PATHOLOGICAL FRACTURE: Primary | ICD-10-CM

## 2022-06-03 PROCEDURE — 1036F TOBACCO NON-USER: CPT | Performed by: INTERNAL MEDICINE

## 2022-06-03 PROCEDURE — 99214 OFFICE O/P EST MOD 30 MIN: CPT | Performed by: INTERNAL MEDICINE

## 2022-06-03 PROCEDURE — 3008F BODY MASS INDEX DOCD: CPT | Performed by: INTERNAL MEDICINE

## 2022-06-03 PROCEDURE — 1160F RVW MEDS BY RX/DR IN RCRD: CPT | Performed by: INTERNAL MEDICINE

## 2022-06-03 PROCEDURE — 96372 THER/PROPH/DIAG INJ SC/IM: CPT | Performed by: INTERNAL MEDICINE

## 2022-06-03 NOTE — ASSESSMENT & PLAN NOTE
She will receive Prolia today-advised to cut back on calcium supplementations as she seems to be getting at least 2 or 3 servings of dairy in her diet  Continue vitamin-D supplementations  Fall prevention    She will be due for DEXA scan next year

## 2022-06-03 NOTE — PROGRESS NOTES
Patient is here for 6 month Prolia injection  Mehdi FLOWERS on fileMed supplied by our office  ABN and consent form signed by the patient  Verbal consent given  Injection administered in left arm  Patient tolerated procedure well

## 2022-06-03 NOTE — PROGRESS NOTES
Kianna Easton 66 y o  female MRN: [id number]    Encounter: [id number]      Assessment/Plan     Problem List Items Addressed This Visit        Endocrine    Thyroid nodule     Will do thyroid ultrasound to better delineate the thyroid nodule           Relevant Orders    US thyroid    Comprehensive metabolic panel Lab Collect    T4, free Lab Collect    TSH, 3rd generation Lab Collect       Musculoskeletal and Integument    Osteoporosis - Primary     She will receive Prolia today-advised to cut back on calcium supplementations as she seems to be getting at least 2 or 3 servings of dairy in her diet  Continue vitamin-D supplementations  Fall prevention  She will be due for DEXA scan next year           Relevant Orders    Comprehensive metabolic panel Lab Collect       Other    Vitamin D deficiency     Continue supplementations           Relevant Orders    Vitamin D 25 hydroxy Lab Collect          CC:   Osteoporosis     History of Present Illness     HPI:  71-year-old female with osteoporosis and vitamin-D deficiency seen in follow-up  She was diagnosed with osteoporosis  in 2017 and Received 1 dose of Prolia in 2017  And restarted prolia nov 2020   no falls , no assistive devices   Good balance   No history of fragility fractures   Currently taking Calcium 1200 mg daily - 2 servings of dairy , vitamin D3 3000  iu daily  C/o arthralgias   No falls or fractures since her last visit  Review of Systems    Historical Information   No past medical history on file    Past Surgical History:   Procedure Laterality Date    CATARACT EXTRACTION, BILATERAL  01/2020    COLONOSCOPY      x2    PERIURETHRAL ABSCESS DRAINAGE  1979     Social History   Social History     Substance and Sexual Activity   Alcohol Use No     Social History     Substance and Sexual Activity   Drug Use No     Social History     Tobacco Use   Smoking Status Never Smoker   Smokeless Tobacco Never Used     Family History:   Family History   Problem Relation Age of Onset    Stroke Mother     Prostate cancer Father 79    Rheum arthritis Sister     Diabetes Brother     Diabetes unspecified Brother     Hypertension Son     Eczema Daughter     No Known Problems Maternal Grandmother     Lung cancer Maternal Grandfather     Stomach cancer Paternal Grandmother 80    Cancer Paternal Grandfather [de-identified]       Meds/Allergies   Current Outpatient Medications   Medication Sig Dispense Refill    Calcium Carbonate (CALCIUM 600 PO) Take 700 mg by mouth 2 (two) times a day       cholecalciferol (VITAMIN D3) 1,000 units tablet Take 3 tablets (3,000 Units total) by mouth daily      esomeprazole (NexIUM) 40 MG capsule Take 1 capsule by mouth daily      ibuprofen (MOTRIN) 200 mg tablet Take 200 mg by mouth every 6 (six) hours as needed for mild pain      MILK THISTLE PO Take by mouth      NON FORMULARY CBD OIL CAPSULES      NON FORMULARY Brain focus capsules      NON FORMULARY Core Immune Zinc-Vitamin C      Omega-3 Fatty Acids (OMEGA-3 CF) 1000 MG CAPS Take by mouth      Turmeric (CURCUMIN 95 PO) Take 400 mg by mouth daily       Current Facility-Administered Medications   Medication Dose Route Frequency Provider Last Rate Last Admin    denosumab (PROLIA) subcutaneous injection 60 mg  60 mg Subcutaneous Q6 Months Robin Ramos MD   60 mg at 06/03/22 1016     No Known Allergies    Objective   Vitals: Blood pressure 102/74, pulse 74, height 5' 2" (1 575 m), weight 68 kg (150 lb)  Physical Exam  Vitals reviewed  Constitutional:       Appearance: Normal appearance  She is not ill-appearing or diaphoretic  HENT:      Head: Normocephalic and atraumatic  Eyes:      General: No scleral icterus  Extraocular Movements: Extraocular movements intact  Neck:      Comments: Right-sided thyroid nodule palpated  Cardiovascular:      Rate and Rhythm: Normal rate and regular rhythm  Heart sounds: Normal heart sounds  No murmur heard    Pulmonary:      Effort: Pulmonary effort is normal  No respiratory distress  Breath sounds: Normal breath sounds  No wheezing  Abdominal:      General: There is no distension  Palpations: Abdomen is soft  Musculoskeletal:      Cervical back: Neck supple  Right lower leg: No edema  Left lower leg: No edema  Lymphadenopathy:      Cervical: No cervical adenopathy  Skin:     General: Skin is warm and dry  Neurological:      General: No focal deficit present  Mental Status: She is alert and oriented to person, place, and time  Psychiatric:         Mood and Affect: Mood normal          Behavior: Behavior normal          Thought Content: Thought content normal          Judgment: Judgment normal          The history was obtained from the review of the chart, patient  Lab Results:   Lab Results   Component Value Date/Time    Potassium 4 5 11/03/2021 09:18 AM    Chloride 108 11/03/2021 09:18 AM    CO2 29 11/03/2021 09:18 AM    BUN 18 11/03/2021 09:18 AM    Creatinine 0 77 11/03/2021 09:18 AM    Glucose, Fasting 94 11/03/2021 09:18 AM    Calcium 9 9 11/03/2021 09:18 AM    eGFR 77 11/03/2021 09:18 AM    TSH 3RD GENERATON 2 090 11/03/2021 09:18 AM    Vit D, 25-Hydroxy 58 6 11/03/2021 09:18 AM         Imaging Studies:         Results for orders placed during the hospital encounter of 09/15/21    DXA bone density spine hip and pelvis    Impression  1  Based on the [facility] classification, this study identifies a diagnosis of osteoporosis, notable at the femoral neck, total hip area, and forearm areas and the patient is considered at risk for fracture  2  A daily intake of calcium of at least 1200 mg and vitamin D, 800-1000 IU, as well as weight bearing and muscle strengthening exercise, fall prevention and avoidance of tobacco and excessive alcohol intake as basic preventive measures are recommended  3  Repeat DXA scan on the same equipment in 18-24 months as clinically indicated        The 10 year risk of hip fracture is 4 4%, with the 10 year risk of major osteoporotic fracture being 16%, as calculated by the WHO fracture risk assessment tool (FRAX)  The current NOF guidelines recommend treating patients with FRAX 10 year risk score  of >3% for hip fracture and >20% for major osteoporotic fracture  Hip fracture risk exceeds treatment thresholds  Continuation of current treatment seems appropriate in this patient  WHO CLASSIFICATION:  Normal (a T-score of -1 0 or higher)  Low bone mineral density (a T-score of less than -1 0 but higher than -2 5)  Osteoporosis (a T-score of -2 5 or less)  Severe osteoporosis (a T-score of -2 5 or less with a fragility fracture)    Thank you for allowing us the opportunity to participate in your patient care  The expanded DEXA report will no longer be arriving in your mail  If you desire to view the full report please contact [address] [place] [address] or access the PACS system  Workstation performed: [id number]            I have personally reviewed pertinent reports  Portions of the record may have been created with voice recognition software  Occasional wrong word or "sound a like" substitutions may have occurred due to the inherent limitations of voice recognition software  Read the chart carefully and recognize, using context, where substitutions have occurred

## 2022-06-16 ENCOUNTER — HOSPITAL ENCOUNTER (OUTPATIENT)
Dept: ULTRASOUND IMAGING | Facility: MEDICAL CENTER | Age: 72
Discharge: HOME | End: 2022-06-16
Payer: COMMERCIAL

## 2022-06-16 DIAGNOSIS — E04.1 THYROID NODULE: ICD-10-CM

## 2022-06-16 PROCEDURE — 76536 US EXAM OF HEAD AND NECK: CPT

## 2022-08-26 ENCOUNTER — OFFICE VISIT (OUTPATIENT)
Dept: FAMILY MEDICINE CLINIC | Facility: CLINIC | Age: 72
End: 2022-08-26
Payer: COMMERCIAL

## 2022-08-26 VITALS
OXYGEN SATURATION: 99 % | SYSTOLIC BLOOD PRESSURE: 112 MMHG | DIASTOLIC BLOOD PRESSURE: 70 MMHG | HEART RATE: 70 BPM | HEIGHT: 62 IN | BODY MASS INDEX: 27.64 KG/M2 | WEIGHT: 150.2 LBS

## 2022-08-26 DIAGNOSIS — R20.2 PARESTHESIAS: ICD-10-CM

## 2022-08-26 DIAGNOSIS — E78.5 HYPERLIPIDEMIA, UNSPECIFIED HYPERLIPIDEMIA TYPE: ICD-10-CM

## 2022-08-26 DIAGNOSIS — E66.3 OVERWEIGHT: ICD-10-CM

## 2022-08-26 DIAGNOSIS — K21.9 GASTROESOPHAGEAL REFLUX DISEASE, UNSPECIFIED WHETHER ESOPHAGITIS PRESENT: ICD-10-CM

## 2022-08-26 DIAGNOSIS — M15.9 GENERALIZED OSTEOARTHRITIS: ICD-10-CM

## 2022-08-26 DIAGNOSIS — E04.1 THYROID NODULE: ICD-10-CM

## 2022-08-26 DIAGNOSIS — Z00.00 ANNUAL PHYSICAL EXAM: Primary | ICD-10-CM

## 2022-08-26 DIAGNOSIS — E55.9 VITAMIN D DEFICIENCY: ICD-10-CM

## 2022-08-26 DIAGNOSIS — M81.0 AGE-RELATED OSTEOPOROSIS WITHOUT CURRENT PATHOLOGICAL FRACTURE: ICD-10-CM

## 2022-08-26 PROCEDURE — 99397 PER PM REEVAL EST PAT 65+ YR: CPT | Performed by: PHYSICIAN ASSISTANT

## 2022-08-26 PROCEDURE — 3725F SCREEN DEPRESSION PERFORMED: CPT | Performed by: PHYSICIAN ASSISTANT

## 2022-08-26 PROCEDURE — 1160F RVW MEDS BY RX/DR IN RCRD: CPT | Performed by: PHYSICIAN ASSISTANT

## 2022-08-26 NOTE — PATIENT INSTRUCTIONS

## 2022-08-26 NOTE — PROGRESS NOTES
[facility]ULT [facility]AL [facility]    NAME: Sherrie Milner  AGE: 67 y o  SEX: female  : 1950     DATE: 2022     Assessment and Plan:     Problem List Items Addressed This Visit        Digestive    Esophageal reflux     Well controlled  Continue Nexium 40 mg daily  Endocrine    Thyroid nodule     Patient had ultrasound in  as ordered by endocrinology  Simply requires a 1-2 year follow-up ultrasound  Relevant Orders    CBC and differential       Musculoskeletal and Integument    Osteoporosis     Currently on Prolia injections plus calcium and vitamin-D supplementation  Will be due for next DEXA scan in 2023  Relevant Orders    CBC and differential    Generalized osteoarthritis     Patient reports that she has switching to Dr Dion Ivory with OA in November  She has continued with injections as needed plus to work and ibuprofen p r n     Will continue to monitor  Other    Overweight     BMI Counseling: Body mass index is 27 47 kg/m²  The BMI is above normal  Nutrition recommendations include decreasing overall calorie intake and 3-5 servings of fruits/vegetables daily  Exercise recommendations include exercising 3-5 times per week  Relevant Orders    CBC and differential    Hyperlipidemia     Currently taking red yeast rice 2 capsules daily as well as Omega 3 fish oil 1 capsule twice daily  LDL was controlled in November last year  Recheck with labs this November  Relevant Orders    Lipid Panel with Direct LDL reflex    CBC and differential    Vitamin D deficiency     Currently on vitamin-D 3000 units daily  Well controlled with level of 58 6  Will continue to monitor  Relevant Orders    CBC and differential    Paresthesias     Noted in right thumb    Reviewed this appears consistent with carpal tunnel syndrome based upon her exam   Encouraged to try wrist splints at bedtime  Call if worsens or persists  Other Visit Diagnoses     Annual physical exam    -  Primary    Relevant Orders    CBC and differential          Immunizations and preventive care screenings were discussed with patient today  Appropriate education was printed on patient's after visit summary  Counseling:  · Exercise: the importance of regular exercise/physical activity was discussed  Recommend exercise 3-5 times per week for at least 30 minutes  BMI Counseling: Body mass index is 27 47 kg/m²  The BMI is above normal  Nutrition recommendations include encouraging healthy choices of fruits and vegetables  Exercise recommendations include exercising 3-5 times per week  Rationale for BMI follow-up plan is due to patient being overweight or obese  Depression Screening and Follow-up Plan: Patient was screened for depression during today's encounter  They screened negative with a PHQ-2 score of 0  Return in 1 year (on 8/26/2023) for AWV  Chief Complaint:     Chief Complaint   Patient presents with    Physical Exam    Numbness     R Thumb, intermittent      History of Present Illness:     Adult Annual Physical   Patient here for a comprehensive physical exam  The patient reports problems - as below  The patient reports that GERD has been well-controlled with Nexium 40 mg daily  Her OA has been stable with injections from Ortho plus turmeric and ibuprofen as needed  She notes that she is transitioning to Dr Marion Meza with [facility] in November  The patient continues with LDL XQ10 (RYR) 2 tabs daily  Last LDL was 83 in 11/2021  The patient denies myalgias  The patient has a history of vitamin-D deficiency and is currently taking 3000 units daily  Last vitamin-D level was 58 6 in 11/2021  She notes intermittent numbness in the right thumb  She notes it primarily with driving and notes that it resolves with shaking the hand   It began 6 months ago and notes it more with her first trip of the day  She notes a swollen an red cuticle on the right index finger since yesterday  Diet and Physical Activity  · Diet/Nutrition: well balanced diet and consuming 3-5 servings of fruits/vegetables daily  · Exercise: no formal exercise and plans to restart walking - also gardens  Depression Screening  PHQ-2/9 Depression Screening    Little interest or pleasure in doing things: 0 - not at all  Feeling down, depressed, or hopeless: 0 - not at all  PHQ-2 Score: 0  PHQ-2 Interpretation: Negative depression screen       General Health  · Sleep: sleeps well and averages 6-7 hours nightly  · Hearing: decreased - bilateral  She notes that she is not quite ready to see the ENT yet - maybe next year  · Vision: goes for regular eye exams and wears glasses  · Dental: regular dental visits  /GYN Health  · Patient is: postmenopausal  · Last menstrual period: around 49       Review of Systems:     Review of Systems   Constitutional: Negative for chills and fever  HENT: Negative for rhinorrhea and sore throat  Eyes: Negative for visual disturbance  Respiratory: Positive for cough (slight - notes that it was triggered by wearing a mask)  Negative for shortness of breath and wheezing  Cardiovascular: Negative for chest pain, palpitations and leg swelling  Gastrointestinal: Negative for abdominal pain, constipation, diarrhea, nausea and vomiting  Endocrine: Negative for polydipsia and polyuria  Genitourinary: Negative for dysuria and frequency  Musculoskeletal: Positive for arthralgias (hands, back and knee)  Negative for myalgias  Skin: Negative for rash  Neurological: Negative for dizziness, syncope and headaches  Hematological: Does not bruise/bleed easily  Psychiatric/Behavioral: Negative for dysphoric mood  The patient is not nervous/anxious  Past Medical History:     History reviewed  No pertinent past medical history     Past Surgical History:     Past Surgical History:   Procedure Laterality Date    CATARACT EXTRACTION, BILATERAL  01/2020    COLONOSCOPY      x2    PERIURETHRAL ABSCESS DRAINAGE  1979      Social History:     Social History     Socioeconomic History    Marital status: /Civil Union     Spouse name: None    Number of children: None    Years of education: None    Highest education level: None   Occupational History    None   Tobacco Use    Smoking status: Never Smoker    Smokeless tobacco: Never Used   Vaping Use    Vaping Use: Never used   Substance and Sexual Activity    Alcohol use: No    Drug use: No    Sexual activity: Yes     Partners: Male     Birth control/protection: None     Comment: Been through menopause   Other Topics Concern    None   Social History Narrative    None     Social Determinants of Health     Financial Resource Strain: Not on file   Food Insecurity: Not on file   Transportation Needs: Not on file   Physical Activity: Not on file   Stress: Not on file   Social Connections: Not on file   Intimate Partner Violence: Not on file   Housing Stability: Not on file      Family History:     Family History   Problem Relation Age of Onset    Stroke Mother     Prostate cancer Father 79    Rheum arthritis Sister     Diabetes Brother     Diabetes unspecified Brother     Hypertension Son     Eczema Daughter     No Known Problems Maternal Grandmother     Lung cancer Maternal Grandfather     Stomach cancer Paternal Grandmother 80    Cancer Paternal Grandfather [de-identified]        colon?       Current Medications:     Current Outpatient Medications   Medication Sig Dispense Refill    Calcium Carbonate (CALCIUM 600 PO) Take 700 mg by mouth 2 (two) times a day       cholecalciferol (VITAMIN D3) 1,000 units tablet Take 3 tablets (3,000 Units total) by mouth daily      esomeprazole (NexIUM) 40 MG capsule Take 1 capsule by mouth daily      ibuprofen (MOTRIN) 200 mg tablet Take 200 mg by mouth every 6 (six) hours as needed for mild pain      NON FORMULARY Take 1 capsule by mouth in the morning CBD OIL CAPSULES        NON FORMULARY Take 3 capsules by mouth in the morning Brain focus capsules        NON FORMULARY Take 2 capsules by mouth in the morning Core Immune Zinc-Vitamin C        NON FORMULARY Take 2 capsules by mouth 2 (two) times a day [employer]      Omega-3 Fatty Acids (OMEGA-3 CF) 1000 MG CAPS Take 1 capsule by mouth 2 (two) times a day       Red Yeast Rice Extract (RED YEAST RICE PO) Take 2 capsules by mouth in the morning      Turmeric (CURCUMIN 95 PO) Take 400 mg by mouth daily       Current Facility-Administered Medications   Medication Dose Route Frequency Provider Last Rate Last Admin    denosumab (PROLIA) subcutaneous injection 60 mg  60 mg Subcutaneous Q6 Months Maged Sanchez MD   60 mg at 06/03/22 1016      Allergies:     No Known Allergies   Physical Exam:     /70 (BP Location: Left arm, Cuff Size: Adult)   Pulse 70   Ht 5' 2" (1 575 m)   Wt 68 1 kg (150 lb 3 2 oz)   SpO2 99%   BMI 27 47 kg/m²     Physical Exam  Vitals reviewed  Constitutional:       General: She is not in acute distress  Appearance: Normal appearance  She is well-developed  She is not ill-appearing  HENT:      Head: Normocephalic and atraumatic  Right Ear: External ear normal  There is impacted cerumen  Left Ear: External ear normal  There is impacted cerumen  Nose: Nose normal       Mouth/Throat:      Mouth: Mucous membranes are moist       Pharynx: No oropharyngeal exudate  Eyes:      Conjunctiva/sclera: Conjunctivae normal       Pupils: Pupils are equal, round, and reactive to light  Neck:      Thyroid: Thyromegaly (right sided (already had recent US)) present  Cardiovascular:      Rate and Rhythm: Normal rate and regular rhythm  Pulses: Normal pulses  Heart sounds: Normal heart sounds  No murmur heard    Pulmonary:      Effort: Pulmonary effort is normal  Breath sounds: Normal breath sounds  No wheezing, rhonchi or rales  Abdominal:      General: Bowel sounds are normal  There is no distension  Palpations: Abdomen is soft  There is no mass  Tenderness: There is no abdominal tenderness  Musculoskeletal:      Cervical back: Normal range of motion and neck supple  Right lower leg: No edema  Left lower leg: No edema  Comments: Positive Tinel's in right wrist and positive Phalen's in right   Lymphadenopathy:      Cervical: No cervical adenopathy  Skin:     General: Skin is warm and dry  Findings: Erythema (around the cuticle of the right index finger with mild TTP but no warmth) present  No rash  Neurological:      Mental Status: She is alert  Sensory: No sensory deficit  Comments: 5/5 strength in UE and LE   Psychiatric:         Mood and Affect: Mood normal          Behavior: Behavior normal          Thought Content:  Thought content normal          Judgment: Judgment normal           Shelby Massey PA-C  [facility] PRIMARY [facility]

## 2022-08-28 PROBLEM — R20.2 PARESTHESIAS: Status: ACTIVE | Noted: 2022-08-28

## 2022-08-28 NOTE — ASSESSMENT & PLAN NOTE
Currently on Prolia injections plus calcium and vitamin-D supplementation  Will be due for next DEXA scan in September 2023

## 2022-08-28 NOTE — ASSESSMENT & PLAN NOTE
Patient had ultrasound in June as ordered by endocrinology  Simply requires a 1-2 year follow-up ultrasound

## 2022-08-28 NOTE — ASSESSMENT & PLAN NOTE
Patient reports that she has switching to Dr Marion Meza with OA in November  She has continued with injections as needed plus to work and ibuprofen p r n     Will continue to monitor

## 2022-08-28 NOTE — ASSESSMENT & PLAN NOTE
Noted in right thumb  Reviewed this appears consistent with carpal tunnel syndrome based upon her exam   Encouraged to try wrist splints at bedtime  Call if worsens or persists

## 2022-08-28 NOTE — ASSESSMENT & PLAN NOTE
Currently on vitamin-D 3000 units daily  Well controlled with level of 58 6  Will continue to monitor

## 2022-08-28 NOTE — ASSESSMENT & PLAN NOTE
Currently taking red yeast rice 2 capsules daily as well as Omega 3 fish oil 1 capsule twice daily  LDL was controlled in November last year  Recheck with labs this November

## 2022-08-28 NOTE — ASSESSMENT & PLAN NOTE
BMI Counseling: Body mass index is 27 47 kg/m²  The BMI is above normal  Nutrition recommendations include decreasing overall calorie intake and 3-5 servings of fruits/vegetables daily  Exercise recommendations include exercising 3-5 times per week

## 2022-10-03 ENCOUNTER — IMMUNIZATIONS (OUTPATIENT)
Dept: FAMILY MEDICINE CLINIC | Facility: CLINIC | Age: 72
End: 2022-10-03
Payer: MEDICARE

## 2022-10-03 DIAGNOSIS — Z23 NEED FOR INFLUENZA VACCINATION: Primary | ICD-10-CM

## 2022-10-03 PROCEDURE — G0008 ADMIN INFLUENZA VIRUS VAC: HCPCS

## 2022-10-03 PROCEDURE — 90662 IIV NO PRSV INCREASED AG IM: CPT

## 2022-10-27 ENCOUNTER — APPOINTMENT (OUTPATIENT)
Dept: LAB | Facility: MEDICAL CENTER | Age: 72
End: 2022-10-27
Payer: MEDICARE

## 2022-10-27 DIAGNOSIS — E04.1 THYROID NODULE: ICD-10-CM

## 2022-10-27 DIAGNOSIS — E78.5 HYPERLIPIDEMIA, UNSPECIFIED HYPERLIPIDEMIA TYPE: ICD-10-CM

## 2022-10-27 DIAGNOSIS — E66.3 OVERWEIGHT: ICD-10-CM

## 2022-10-27 DIAGNOSIS — Z00.00 ANNUAL PHYSICAL EXAM: ICD-10-CM

## 2022-10-27 DIAGNOSIS — M81.0 AGE-RELATED OSTEOPOROSIS WITHOUT CURRENT PATHOLOGICAL FRACTURE: ICD-10-CM

## 2022-10-27 DIAGNOSIS — E55.9 VITAMIN D DEFICIENCY: ICD-10-CM

## 2022-10-27 LAB
25(OH)D3 SERPL-MCNC: 54.8 NG/ML (ref 30–100)
ALBUMIN SERPL BCP-MCNC: 3.9 G/DL (ref 3.5–5)
ALP SERPL-CCNC: 32 U/L (ref 46–116)
ALT SERPL W P-5'-P-CCNC: 23 U/L (ref 12–78)
ANION GAP SERPL CALCULATED.3IONS-SCNC: 3 MMOL/L (ref 4–13)
AST SERPL W P-5'-P-CCNC: 10 U/L (ref 5–45)
BASOPHILS # BLD AUTO: 0.03 THOUSANDS/ÂΜL (ref 0–0.1)
BASOPHILS NFR BLD AUTO: 1 % (ref 0–1)
BILIRUB SERPL-MCNC: 1.39 MG/DL (ref 0.2–1)
BUN SERPL-MCNC: 22 MG/DL (ref 5–25)
CALCIUM SERPL-MCNC: 9.4 MG/DL (ref 8.3–10.1)
CHLORIDE SERPL-SCNC: 109 MMOL/L (ref 96–108)
CHOLEST SERPL-MCNC: 156 MG/DL
CO2 SERPL-SCNC: 28 MMOL/L (ref 21–32)
CREAT SERPL-MCNC: 0.8 MG/DL (ref 0.6–1.3)
EOSINOPHIL # BLD AUTO: 0.13 THOUSAND/ÂΜL (ref 0–0.61)
EOSINOPHIL NFR BLD AUTO: 3 % (ref 0–6)
ERYTHROCYTE [DISTWIDTH] IN BLOOD BY AUTOMATED COUNT: 14 % (ref 11.6–15.1)
GFR SERPL CREATININE-BSD FRML MDRD: 73 ML/MIN/1.73SQ M
GLUCOSE P FAST SERPL-MCNC: 95 MG/DL (ref 65–99)
HCT VFR BLD AUTO: 40.1 % (ref 34.8–46.1)
HDLC SERPL-MCNC: 81 MG/DL
HGB BLD-MCNC: 13.2 G/DL (ref 11.5–15.4)
IMM GRANULOCYTES # BLD AUTO: 0.01 THOUSAND/UL (ref 0–0.2)
IMM GRANULOCYTES NFR BLD AUTO: 0 % (ref 0–2)
LDLC SERPL CALC-MCNC: 59 MG/DL (ref 0–100)
LYMPHOCYTES # BLD AUTO: 1.11 THOUSANDS/ÂΜL (ref 0.6–4.47)
LYMPHOCYTES NFR BLD AUTO: 26 % (ref 14–44)
MCH RBC QN AUTO: 29 PG (ref 26.8–34.3)
MCHC RBC AUTO-ENTMCNC: 32.9 G/DL (ref 31.4–37.4)
MCV RBC AUTO: 88 FL (ref 82–98)
MONOCYTES # BLD AUTO: 0.3 THOUSAND/ÂΜL (ref 0.17–1.22)
MONOCYTES NFR BLD AUTO: 7 % (ref 4–12)
NEUTROPHILS # BLD AUTO: 2.62 THOUSANDS/ÂΜL (ref 1.85–7.62)
NEUTS SEG NFR BLD AUTO: 63 % (ref 43–75)
NRBC BLD AUTO-RTO: 0 /100 WBCS
PLATELET # BLD AUTO: 196 THOUSANDS/UL (ref 149–390)
PMV BLD AUTO: 9.1 FL (ref 8.9–12.7)
POTASSIUM SERPL-SCNC: 4.2 MMOL/L (ref 3.5–5.3)
PROT SERPL-MCNC: 7.1 G/DL (ref 6.4–8.4)
RBC # BLD AUTO: 4.55 MILLION/UL (ref 3.81–5.12)
SODIUM SERPL-SCNC: 140 MMOL/L (ref 135–147)
T4 FREE SERPL-MCNC: 0.92 NG/DL (ref 0.76–1.46)
TRIGL SERPL-MCNC: 78 MG/DL
TSH SERPL DL<=0.05 MIU/L-ACNC: 2.86 UIU/ML (ref 0.45–4.5)
WBC # BLD AUTO: 4.2 THOUSAND/UL (ref 4.31–10.16)

## 2022-10-27 PROCEDURE — 82306 VITAMIN D 25 HYDROXY: CPT

## 2022-10-27 PROCEDURE — 84439 ASSAY OF FREE THYROXINE: CPT

## 2022-10-27 PROCEDURE — 80061 LIPID PANEL: CPT

## 2022-10-27 PROCEDURE — 84443 ASSAY THYROID STIM HORMONE: CPT

## 2022-10-27 PROCEDURE — 36415 COLL VENOUS BLD VENIPUNCTURE: CPT

## 2022-10-27 PROCEDURE — 85025 COMPLETE CBC W/AUTO DIFF WBC: CPT

## 2022-10-27 PROCEDURE — 80053 COMPREHEN METABOLIC PANEL: CPT

## 2022-10-28 ENCOUNTER — TELEPHONE (OUTPATIENT)
Dept: ENDOCRINOLOGY | Facility: CLINIC | Age: 72
End: 2022-10-28

## 2022-11-15 NOTE — ASSESSMENT & PLAN NOTE
[facility] Death Summary    Attending physician:  Johan Ventura MD    Physician pronouncing death: Tenzin Ann MD    Physician certifying death: Johan Ventura MD    Date of hospital admission: 10/23/2022    Date of death:   11/14/2022  Time of death: 2227    Autopsy performed?  No     reports that she has been smoking cigarettes. She has a 4.75 pack-year smoking history. She has never used smokeless tobacco. She reports previous alcohol use of about 3.0 standard drinks of alcohol per week. She reports current drug use. Frequency: 0.50 times per week. Drug: Marijuana.  Did tobacco use contribute to death?  Unknown    Did alcohol use contribute to death?  Yes    Pregnancy Status   Not pregnant at the time of death or within the past year      Manner of Death: Natural    Referred to medical examiner? No     Cause of Death Interval Between Onset & Death   1 Respiratory failure 1 week   2 Acute on chronic liver failure 3 weeks   3 Acute blood loss anemia 1 week   4       Other Significant Conditions    1.  Esophageal varices  2.   Portal HTN    Hospital Course:    41 year old female with a past medical history significant for decompensated ETOH cirrhosis complicated by portal hypertension and EV bleed s/p TIPS (2020-> revised in 3/2022) who presents to [facility] ED on 10/21 with complaints of progressive SOB and productive cough x 3 weeks. She was found to have Klebisella UTI as well as AoCLF (likely alcoholic hepatitis on top of known cirrhosis). She was transferred to [facility] on 10/22 for hepatology specialty. On 10/26 started on treatment for PNA given worsening radiographic evidence of PNA/bilateral opacities including prednisone. SHe ultimately transferred to ICU on 10/29/22 for progressive hypoxia. CXR showed extensive multifocal consolidation, pt was intubated upon arrival to SICU and BAL sent for cultures.  She was deemed not a transplant candidate due to recalcitrant EtOH use. She continued to be  Continue with vitamin D 3000 units daily  Last level was 40 6  Recheck with next labs  ventilator dependent.  She was extubated on  and subsequently made DNR/DNI with the plan to transition to comfort if no improvement or worsening. Unfortunately, on  she continued to have increased O2 needs as well as bleeding and thrombocytopenia.  At that time the family made the decision to transition to comfort.  All support was discontinued and transitioned to comfort care. Patient  on above time    Tenzin Ann MD

## 2022-12-05 ENCOUNTER — OFFICE VISIT (OUTPATIENT)
Dept: ENDOCRINOLOGY | Facility: CLINIC | Age: 72
End: 2022-12-05

## 2022-12-05 VITALS
OXYGEN SATURATION: 97 % | WEIGHT: 153 LBS | SYSTOLIC BLOOD PRESSURE: 112 MMHG | HEIGHT: 62 IN | HEART RATE: 64 BPM | BODY MASS INDEX: 28.16 KG/M2 | DIASTOLIC BLOOD PRESSURE: 80 MMHG

## 2022-12-05 DIAGNOSIS — M81.0 AGE-RELATED OSTEOPOROSIS WITHOUT CURRENT PATHOLOGICAL FRACTURE: Primary | ICD-10-CM

## 2022-12-05 DIAGNOSIS — E55.9 VITAMIN D DEFICIENCY: ICD-10-CM

## 2022-12-05 DIAGNOSIS — E04.1 THYROID NODULE: ICD-10-CM

## 2022-12-05 NOTE — PROGRESS NOTES
Established Patient Progress Note       Chief Complaint   Patient presents with   • Osteoporosis   • Thyroid Problem   • Injections     Pt is here for 6M Prolia  Last administered 6/3/22  History of Present Illness:     Fabian Shah is a 67 y o  female with a history of osteoporosis, Vitamin D Deficiency, and Thyroid nodules  For the osteoporosis, she is being treated with Prolia  She got 1 dose in 2017 then restarted in 2020  She denies any falls/fractures since the last visit  Denies problems with prolia  She is making sure to take calcium supplement 600mg per day plus her dietary calcium  For Vitamin D Deficiency, she is taking Supplements 3,000 units daily  Denies any dental problems or kidney stones  For the thyroid nodules, she denies dysphagia  She denies FH of thyroid cancer or radiation therapy to her head/neck  Patient Active Problem List   Diagnosis   • Osteoporosis   • Joint pain, knee   • Generalized osteoarthritis   • Esophageal reflux   • Elevated bilirubin   • Midline cystocele   • Overweight   • Hyperlipidemia   • Vitamin D deficiency   • Postmenopausal status   • Thyroid nodule   • Paresthesias      History reviewed  No pertinent past medical history  Past Surgical History:   Procedure Laterality Date   • CATARACT EXTRACTION, BILATERAL  01/2020   • COLONOSCOPY      x2   • PERIURETHRAL ABSCESS DRAINAGE  1979      Family History   Problem Relation Age of Onset   • Stroke Mother    • Prostate cancer Father 79   • Rheum arthritis Sister    • Osteoporosis Sister    • Diabetes Brother    • Diabetes unspecified Brother    • Hypertension Son    • Eczema Daughter    • No Known Problems Maternal Grandmother    • Lung cancer Maternal Grandfather    • Stomach cancer Paternal Grandmother 80   • Cancer Paternal Grandfather [de-identified]        colon?      Social History     Tobacco Use   • Smoking status: Never   • Smokeless tobacco: Never   Substance Use Topics   • Alcohol use: No     No Known Allergies    Current Outpatient Medications:   •  Calcium Carbonate (CALCIUM 600 PO), Take 700 mg by mouth 2 (two) times a day , Disp: , Rfl:   •  cholecalciferol (VITAMIN D3) 1,000 units tablet, Take 3 tablets (3,000 Units total) by mouth daily, Disp: , Rfl:   •  esomeprazole (NexIUM) 40 MG capsule, Take 1 capsule by mouth daily, Disp: , Rfl:   •  NON FORMULARY, Take 1 capsule by mouth in the morning CBD OIL CAPSULES  , Disp: , Rfl:   •  NON FORMULARY, Take 3 capsules by mouth in the morning Brain focus capsules  , Disp: , Rfl:   •  NON FORMULARY, Take 2 capsules by mouth in the morning Core Immune Zinc-Vitamin C  , Disp: , Rfl:   •  Omega-3 Fatty Acids (OMEGA-3 CF) 1000 MG CAPS, Take 1 capsule by mouth 2 (two) times a day , Disp: , Rfl:   •  Red Yeast Rice Extract (RED YEAST RICE PO), Take 2 capsules by mouth in the morning, Disp: , Rfl:   •  Turmeric (CURCUMIN 95 PO), Take 400 mg by mouth daily, Disp: , Rfl:   •  ibuprofen (MOTRIN) 200 mg tablet, Take 200 mg by mouth every 6 (six) hours as needed for mild pain (Patient not taking: Reported on 12/5/2022), Disp: , Rfl:     Current Facility-Administered Medications:   •  denosumab (PROLIA) subcutaneous injection 60 mg, 60 mg, Subcutaneous, Q6 Months, Robin Ramos MD, 60 mg at 12/05/22 1005    Review of Systems   Constitutional: Negative for activity change, appetite change, chills, diaphoresis, fatigue, fever and unexpected weight change  HENT: Negative for trouble swallowing and voice change  Eyes: Negative for visual disturbance  Respiratory: Negative for shortness of breath  Cardiovascular: Negative for chest pain and palpitations  Gastrointestinal: Negative for abdominal pain, constipation and diarrhea  Endocrine: Negative for cold intolerance, heat intolerance, polydipsia, polyphagia and polyuria  Genitourinary: Negative for frequency and menstrual problem  Musculoskeletal: Positive for arthralgias and back pain  Negative for myalgias  Skin: Negative for rash  Allergic/Immunologic: Negative for food allergies  Neurological: Negative for dizziness and tremors  Hematological: Negative for adenopathy  Psychiatric/Behavioral: Negative for sleep disturbance  All other systems reviewed and are negative  Physical Exam:  Body mass index is 27 98 kg/m²  /80 (BP Location: Left arm, Patient Position: Sitting, Cuff Size: Standard)   Pulse 64   Ht 5' 2" (1 575 m)   Wt 69 4 kg (153 lb)   SpO2 97%   BMI 27 98 kg/m²    Wt Readings from Last 3 Encounters:   12/05/22 69 4 kg (153 lb)   08/26/22 68 1 kg (150 lb 3 2 oz)   06/03/22 68 kg (150 lb)       Physical Exam  Vitals reviewed  Constitutional:       General: She is not in acute distress  Appearance: She is well-developed and well-nourished  HENT:      Head: Normocephalic and atraumatic  Eyes:      Conjunctiva/sclera: Conjunctivae normal       Pupils: Pupils are equal, round, and reactive to light  Neck:      Thyroid: No thyromegaly  Cardiovascular:      Rate and Rhythm: Normal rate and regular rhythm  Heart sounds: Normal heart sounds  Pulmonary:      Effort: Pulmonary effort is normal  No respiratory distress  Breath sounds: Normal breath sounds  No wheezing or rales  Abdominal:      General: Bowel sounds are normal  There is no distension  Palpations: Abdomen is soft  Tenderness: There is no abdominal tenderness  Musculoskeletal:         General: No edema  Normal range of motion  Cervical back: Normal range of motion and neck supple  Skin:     General: Skin is warm and dry  Neurological:      Mental Status: She is alert and oriented to person, place, and time     Psychiatric:         Mood and Affect: Mood and affect normal          Labs:   Component      Latest Ref Rng & Units 10/27/2022 10/27/2022 10/27/2022 10/27/2022           9:02 AM  9:02 AM  9:02 AM  9:02 AM   WBC      4 31 - 10 16 Thousand/uL 4 20 (L)      Red Blood Cell Count      3 81 - 5 12 Million/uL 4 55      Hemoglobin      11 5 - 15 4 g/dL 13 2      HCT      34 8 - 46 1 % 40 1      MCV      82 - 98 fL 88      MCH      26 8 - 34 3 pg 29 0      MCHC      31 4 - 37 4 g/dL 32 9      RDW      11 6 - 15 1 % 14 0      MPV      8 9 - 12 7 fL 9 1      Platelet Count      046 - 390 Thousands/uL 196      nRBC      /100 WBCs 0      Neutrophils %      43 - 75 % 63      Immat GRANS %      0 - 2 % 0      Lymphocytes Relative      14 - 44 % 26      Monocytes Relative      4 - 12 % 7      Eosinophils      0 - 6 % 3      Basophils Relative      0 - 1 % 1      Absolute Neutrophils      1 85 - 7 62 Thousands/µL 2 62      Immature Grans Absolute      0 00 - 0 20 Thousand/uL 0 01      Lymphocytes Absolute      0 60 - 4 47 Thousands/µL 1 11      Absolute Monocytes      0 17 - 1 22 Thousand/µL 0 30      Absolute Eosinophils      0 00 - 0 61 Thousand/µL 0 13      Basophils Absolute      0 00 - 0 10 Thousands/µL 0 03      Sodium      135 - 147 mmol/L    140   Potassium      3 5 - 5 3 mmol/L    4 2   Chloride      96 - 108 mmol/L    109 (H)   CO2      21 - 32 mmol/L    28   Anion Gap      4 - 13 mmol/L    3 (L)   BUN      5 - 25 mg/dL    22   Creatinine      0 60 - 1 30 mg/dL    0 80   GLUCOSE FASTING      65 - 99 mg/dL    95   Calcium      8 3 - 10 1 mg/dL    9 4   AST      5 - 45 U/L    10   ALT      12 - 78 U/L    23   Alkaline Phosphatase      46 - 116 U/L    32 (L)   Total Protein      6 4 - 8 4 g/dL    7 1   Albumin      3 5 - 5 0 g/dL    3 9   TOTAL BILIRUBIN      0 20 - 1 00 mg/dL    1 39 (H)   eGFR      ml/min/1 73sq m    73   Cholesterol      See Comment mg/dL   156    Triglycerides      See Comment mg/dL   78    HDL      >=50 mg/dL   81    LDL Calculated      0 - 100 mg/dL   59    Vit D, 25-Hydroxy      30 0 - 100 0 ng/mL  54 8     Free T4      0 76 - 1 46 ng/dL       TSH 3RD GENERATON      0 450 - 4 500 uIU/mL         Component      Latest Ref Rng & Units 10/27/2022 10/27/2022 9:02 AM  9:02 AM   WBC      4 31 - 10 16 Thousand/uL     Red Blood Cell Count      3 81 - 5 12 Million/uL     Hemoglobin      11 5 - 15 4 g/dL     HCT      34 8 - 46 1 %     MCV      82 - 98 fL     MCH      26 8 - 34 3 pg     MCHC      31 4 - 37 4 g/dL     RDW      11 6 - 15 1 %     MPV      8 9 - 12 7 fL     Platelet Count      772 - 390 Thousands/uL     nRBC      /100 WBCs     Neutrophils %      43 - 75 %     Immat GRANS %      0 - 2 %     Lymphocytes Relative      14 - 44 %     Monocytes Relative      4 - 12 %     Eosinophils      0 - 6 %     Basophils Relative      0 - 1 %     Absolute Neutrophils      1 85 - 7 62 Thousands/µL     Immature Grans Absolute      0 00 - 0 20 Thousand/uL     Lymphocytes Absolute      0 60 - 4 47 Thousands/µL     Absolute Monocytes      0 17 - 1 22 Thousand/µL     Absolute Eosinophils      0 00 - 0 61 Thousand/µL     Basophils Absolute      0 00 - 0 10 Thousands/µL     Sodium      135 - 147 mmol/L     Potassium      3 5 - 5 3 mmol/L     Chloride      96 - 108 mmol/L     CO2      21 - 32 mmol/L     Anion Gap      4 - 13 mmol/L     BUN      5 - 25 mg/dL     Creatinine      0 60 - 1 30 mg/dL     GLUCOSE FASTING      65 - 99 mg/dL     Calcium      8 3 - 10 1 mg/dL     AST      5 - 45 U/L     ALT      12 - 78 U/L     Alkaline Phosphatase      46 - 116 U/L     Total Protein      6 4 - 8 4 g/dL     Albumin      3 5 - 5 0 g/dL     TOTAL BILIRUBIN      0 20 - 1 00 mg/dL     eGFR      ml/min/1 73sq m     Cholesterol      See Comment mg/dL     Triglycerides      See Comment mg/dL     HDL      >=50 mg/dL     LDL Calculated      0 - 100 mg/dL     Vit D, 25-Hydroxy      30 0 - 100 0 ng/mL     Free T4      0 76 - 1 46 ng/dL 0 92    TSH 3RD GENERATON      0 450 - 4 500 uIU/mL  2 860         Impression & Plan:    Problem List Items Addressed This Visit        Endocrine    Thyroid nodule     Ultrasound from 6/2022 showed small nodules, will repeat testing in 2 years to monitor for stability   Thyroid function is normal              Musculoskeletal and Integument    Osteoporosis - Primary     Continue Prolia every 6 months along with calcium and Vitamin D supplement  She will get prolia today  She will be due for DEXA Scan 9/2023  Will order at next visit  Relevant Orders    Comprehensive metabolic panel       Other    Vitamin D deficiency     Continue supplement  Orders Placed This Encounter   Procedures   • Comprehensive metabolic panel     This is a patient instruction: Patient fasting for 8 hours or longer recommended  Standing Status:   Future     Standing Expiration Date:   12/5/2023       There are no Patient Instructions on file for this visit  Discussed with the patient and all questioned fully answered  She will call me if any problems arise  Follow-up appointment in 6 months       Counseled patient on diagnostic results, prognosis, risk and benefit of treatment options, instruction for management, importance of treatment compliance, Risk  factor reduction and impressions      Kajta Donohue PA-C

## 2022-12-05 NOTE — PROGRESS NOTES
Assessment/Plan:    Jose Frye came into the [facility]'s Endocrinology Office today 12/05/22 to receive Prolia injection  Verbal consent obtained  Consent given by: patient    patient states patient has been medically healthy with no underlining concerns/complications  Jose Frye presents with no symptoms today  All insturctions were reviewed with the patient  If the patient should have any questions/concerns, advised patient to contacted [facility]  Subjective:     History provided by: patient    Patient ID: Jose Frye is a 67 y o  female      Objective:    Vitals:    12/05/22 0923   BP: 112/80   BP Location: Left arm   Patient Position: Sitting   Cuff Size: Standard   Pulse: 64   SpO2: 97%   Weight: 69 4 kg (153 lb)   Height: 5' 2" (1 575 m)       Patient tolerated the injection well without any complications  Injection site/s left arm  Medication was provided by office  Patient signed consent form yes   Patient signed Orysia Anthony form yes (If no patient is not a medicare patient)  Patient waited 15 minutes after injection no (This only applies to patient's receiving first time injection)         Last Visit: 10/28/2022  Next visit:Visit date not found

## 2022-12-05 NOTE — ASSESSMENT & PLAN NOTE
Continue Prolia every 6 months along with calcium and Vitamin D supplement  She will get prolia today  She will be due for DEXA Scan 9/2023  Will order at next visit

## 2022-12-05 NOTE — ASSESSMENT & PLAN NOTE
Ultrasound from 6/2022 showed small nodules, will repeat testing in 2 years to monitor for stability   Thyroid function is normal

## 2023-05-19 ENCOUNTER — TELEPHONE (OUTPATIENT)
Dept: ENDOCRINOLOGY | Facility: CLINIC | Age: 73
End: 2023-05-19

## 2023-05-24 ENCOUNTER — APPOINTMENT (OUTPATIENT)
Dept: LAB | Facility: MEDICAL CENTER | Age: 73
End: 2023-05-24

## 2023-05-24 DIAGNOSIS — M81.0 AGE-RELATED OSTEOPOROSIS WITHOUT CURRENT PATHOLOGICAL FRACTURE: ICD-10-CM

## 2023-05-24 LAB
ALBUMIN SERPL BCP-MCNC: 3.8 G/DL (ref 3.5–5)
ALP SERPL-CCNC: 42 U/L (ref 46–116)
ALT SERPL W P-5'-P-CCNC: 19 U/L (ref 12–78)
ANION GAP SERPL CALCULATED.3IONS-SCNC: 0 MMOL/L (ref 4–13)
AST SERPL W P-5'-P-CCNC: 14 U/L (ref 5–45)
BILIRUB SERPL-MCNC: 1.2 MG/DL (ref 0.2–1)
BUN SERPL-MCNC: 19 MG/DL (ref 5–25)
CALCIUM SERPL-MCNC: 9.6 MG/DL (ref 8.3–10.1)
CHLORIDE SERPL-SCNC: 110 MMOL/L (ref 96–108)
CO2 SERPL-SCNC: 28 MMOL/L (ref 21–32)
CREAT SERPL-MCNC: 0.82 MG/DL (ref 0.6–1.3)
GFR SERPL CREATININE-BSD FRML MDRD: 71 ML/MIN/1.73SQ M
GLUCOSE P FAST SERPL-MCNC: 93 MG/DL (ref 65–99)
POTASSIUM SERPL-SCNC: 4.7 MMOL/L (ref 3.5–5.3)
PROT SERPL-MCNC: 7 G/DL (ref 6.4–8.4)
SODIUM SERPL-SCNC: 138 MMOL/L (ref 135–147)

## 2023-06-07 ENCOUNTER — TELEPHONE (OUTPATIENT)
Dept: ENDOCRINOLOGY | Facility: CLINIC | Age: 73
End: 2023-06-07

## 2023-06-07 ENCOUNTER — OFFICE VISIT (OUTPATIENT)
Dept: ENDOCRINOLOGY | Facility: CLINIC | Age: 73
End: 2023-06-07
Payer: MEDICARE

## 2023-06-07 VITALS
WEIGHT: 146.6 LBS | BODY MASS INDEX: 26.98 KG/M2 | HEIGHT: 62 IN | HEART RATE: 61 BPM | SYSTOLIC BLOOD PRESSURE: 114 MMHG | DIASTOLIC BLOOD PRESSURE: 86 MMHG

## 2023-06-07 DIAGNOSIS — M81.0 AGE-RELATED OSTEOPOROSIS WITHOUT CURRENT PATHOLOGICAL FRACTURE: Primary | ICD-10-CM

## 2023-06-07 DIAGNOSIS — E04.1 THYROID NODULE: ICD-10-CM

## 2023-06-07 DIAGNOSIS — E55.9 VITAMIN D DEFICIENCY: ICD-10-CM

## 2023-06-07 DIAGNOSIS — R74.8 LOW SERUM ALKALINE PHOSPHATASE: ICD-10-CM

## 2023-06-07 PROCEDURE — 96372 THER/PROPH/DIAG INJ SC/IM: CPT | Performed by: INTERNAL MEDICINE

## 2023-06-07 PROCEDURE — 99214 OFFICE O/P EST MOD 30 MIN: CPT | Performed by: INTERNAL MEDICINE

## 2023-06-07 NOTE — PROGRESS NOTES
"Assessment/Plan:    Dinora Cheung came into the [facility]'s Endocrinology Office today 06/07/23 to receive Prolia injection  Verbal consent obtained  Consent given by: patient    patient states patient has been medically healthy with no underlining concerns/complications  Dinora Cheung presents with no symptoms today  All insturctions were reviewed with the patient  If the patient should have any questions/concerns, advised patient to contacted [facility]  Subjective:     History provided by: patient    Patient ID: Dinora Cheung is a 67 y o  female      Objective:    Vitals:    06/07/23 1000   BP: 114/86   Pulse: 61   Weight: 66 5 kg (146 lb 9 6 oz)   Height: 5' 1 5\" (1 562 m)       Patient tolerated the injection well without any complications  Injection site/s right arm  Medication was provided by office  Patient signed consent form yes   Patient signed Onita Wills form yes (If no patient is not a medicare patient)  Patient waited 15 minutes after injection no (This only applies to patient's receiving first time injection)         Last Visit: 5/19/2023  Next visit:6/7/2023    "

## 2023-06-07 NOTE — PROGRESS NOTES
Debi Search 67 y o  female MRN: [id number]    Encounter: [id number]      Assessment/Plan     Problem List Items Addressed This Visit        Endocrine    Thyroid nodule     Repeat thyroid ultrasound to monitor for any changes in the size or characteristics of the thyroid nodules         Relevant Orders    US thyroid    TSH, 3rd generation Lab Collect    T4, free Lab Collect       Musculoskeletal and Integument    Osteoporosis - Primary     Continue calcium and vitamin D supplementations-fall prevention    She will be due for repeat DEXA later this year  Continue Prolia         Relevant Orders    DXA bone density spine hip and pelvis    Comprehensive metabolic panel Lab Collect    PTH, intact Lab Collect Lab Collect    Phosphorus Lab Collect       Other    Low serum alkaline phosphatase     Alk phos was normal prior to 2017-has been low in the past few years-likely related to antiresorptive         Relevant Orders    Alkaline phosphatase, bone specific    Vitamin D deficiency     Continue supplementations         Relevant Orders    Vitamin D 25 hydroxy Lab Collect       CC:   Osteoporosis    History of Present Illness     HPI:  67 y o  female with a history of osteoporosis, Vitamin D Deficiency, and Thyroid nodules   She was diagnosed with osteoporosis  in 2017 and Received 1 dose of Prolia in 2017  And restarted prolia nov 2020     2 servings of dairy in diet and calcium 600 mg once daily in supplements   Vitamin D3 3000 iu daily     One fall a few weeks back after tripping over a garden hose   No assistive devices     No obstructive symptoms       Review of Systems    Historical Information   Past Medical History:   Diagnosis Date   • Osteoporosis      Past Surgical History:   Procedure Laterality Date   • CATARACT EXTRACTION, BILATERAL  01/2020   • COLONOSCOPY      x2   • PERIURETHRAL ABSCESS DRAINAGE  1979     Social History   Social History     Substance and Sexual Activity   Alcohol Use No     Social "History     Substance and Sexual Activity   Drug Use No     Social History     Tobacco Use   Smoking Status Never   Smokeless Tobacco Never     Family History:   Family History   Problem Relation Age of Onset   • Stroke Mother    • Prostate cancer Father 79   • Rheum arthritis Sister    • Osteoporosis Sister    • Diabetes Brother    • Diabetes unspecified Brother    • Hypertension Son    • Eczema Daughter    • No Known Problems Maternal Grandmother    • Lung cancer Maternal Grandfather    • Stomach cancer Paternal Grandmother 80   • Cancer Paternal Grandfather [de-identified]        colon? Meds/Allergies   Current Outpatient Medications   Medication Sig Dispense Refill   • Calcium Carbonate (CALCIUM 600 PO) Take 700 mg by mouth 2 (two) times a day      • cholecalciferol (VITAMIN D3) 1,000 units tablet Take 3 tablets (3,000 Units total) by mouth daily     • esomeprazole (NexIUM) 40 MG capsule Take 1 capsule by mouth daily     • ibuprofen (MOTRIN) 200 mg tablet Take 200 mg by mouth every 6 (six) hours as needed for mild pain     • NON FORMULARY Take 1 capsule by mouth in the morning CBD OIL CAPSULES       • NON FORMULARY Take 3 capsules by mouth in the morning Brain focus capsules       • NON FORMULARY Take 2 capsules by mouth in the morning Core Immune Zinc-Vitamin C       • Omega-3 Fatty Acids (OMEGA-3 CF) 1000 MG CAPS Take 1 capsule by mouth 2 (two) times a day      • Red Yeast Rice Extract (RED YEAST RICE PO) Take 2 capsules by mouth in the morning     • Turmeric (CURCUMIN 95 PO) Take 400 mg by mouth daily       Current Facility-Administered Medications   Medication Dose Route Frequency Provider Last Rate Last Admin   • denosumab (PROLIA) subcutaneous injection 60 mg  60 mg Subcutaneous Q6 Months Robin Ramos MD   60 mg at 06/07/23 1036     No Known Allergies    Objective   Vitals: Blood pressure 114/86, pulse 61, height 5' 1 5\" (1 562 m), weight 66 5 kg (146 lb 9 6 oz)  Physical Exam  Vitals reviewed   " Constitutional:       General: She is not in acute distress  Appearance: Normal appearance  She is not ill-appearing, toxic-appearing or diaphoretic  HENT:      Head: Normocephalic and atraumatic  Eyes:      General: No scleral icterus  Extraocular Movements: Extraocular movements intact  Neck:      Comments: + Right sided/isthmus  thyroid nodule palpated  Cardiovascular:      Rate and Rhythm: Normal rate and regular rhythm  Heart sounds: Normal heart sounds  No murmur heard  Pulmonary:      Effort: Pulmonary effort is normal  No respiratory distress  Breath sounds: Normal breath sounds  No wheezing or rales  Abdominal:      General: There is no distension  Palpations: Abdomen is soft  Tenderness: There is no abdominal tenderness  Musculoskeletal:      Cervical back: Neck supple  Right lower leg: No edema  Left lower leg: No edema  Lymphadenopathy:      Cervical: No cervical adenopathy  Skin:     General: Skin is warm and dry  Neurological:      General: No focal deficit present  Mental Status: She is alert and oriented to person, place, and time  Gait: Gait normal    Psychiatric:         Mood and Affect: Mood normal          Behavior: Behavior normal          Thought Content: Thought content normal          Judgment: Judgment normal          The history was obtained from the review of the chart, patient      Lab Results:   Lab Results   Component Value Date/Time    BUN 19 05/24/2023 07:59 AM    BUN 22 10/27/2022 09:02 AM    Calcium 9 6 05/24/2023 07:59 AM    Calcium 9 4 10/27/2022 09:02 AM    Chloride 110 (H) 05/24/2023 07:59 AM    Chloride 109 (H) 10/27/2022 09:02 AM    CO2 28 05/24/2023 07:59 AM    CO2 28 10/27/2022 09:02 AM    Creatinine 0 82 05/24/2023 07:59 AM    Creatinine 0 80 10/27/2022 09:02 AM    eGFR 71 05/24/2023 07:59 AM    eGFR 73 10/27/2022 09:02 AM    Free T4 0 92 10/27/2022 09:02 AM    Glucose, Fasting 93 05/24/2023 07:59 AM Glucose, Fasting 95 10/27/2022 09:02 AM    Potassium 4 7 05/24/2023 07:59 AM    Potassium 4 2 10/27/2022 09:02 AM    TSH 3RD GENERATON 2 860 10/27/2022 09:02 AM    Vit D, 25-Hydroxy 54 8 10/27/2022 09:02 AM         Imaging Studies:         Results for orders placed during the hospital encounter of 09/15/21    DXA bone density spine hip and pelvis    Impression  1  Based on the [facility] classification, this study identifies a diagnosis of osteoporosis, notable at the femoral neck, total hip area, and forearm areas and the patient is considered at risk for fracture  2  A daily intake of calcium of at least 1200 mg and vitamin D, 800-1000 IU, as well as weight bearing and muscle strengthening exercise, fall prevention and avoidance of tobacco and excessive alcohol intake as basic preventive measures are recommended  3  Repeat DXA scan on the same equipment in 18-24 months as clinically indicated  The 10 year risk of hip fracture is 4 4%, with the 10 year risk of major osteoporotic fracture being 16%, as calculated by the [facility] fracture risk assessment tool (FRAX)  The current NOF guidelines recommend treating patients with FRAX 10 year risk score  of >3% for hip fracture and >20% for major osteoporotic fracture  Hip fracture risk exceeds treatment thresholds  Continuation of current treatment seems appropriate in this patient  WHO CLASSIFICATION:  Normal (a T-score of -1 0 or higher)  Low bone mineral density (a T-score of less than -1 0 but higher than -2 5)  Osteoporosis (a T-score of -2 5 or less)  Severe osteoporosis (a T-score of -2 5 or less with a fragility fracture)    Thank you for allowing us the opportunity to participate in your patient care  The expanded DEXA report will no longer be arriving in your mail  If you desire to view the full report please contact [facility] or access the PACS system            Workstation performed: [id number]            I have personally "reviewed pertinent reports  Portions of the record may have been created with voice recognition software  Occasional wrong word or \"sound a like\" substitutions may have occurred due to the inherent limitations of voice recognition software  Read the chart carefully and recognize, using context, where substitutions have occurred    "

## 2023-06-07 NOTE — ASSESSMENT & PLAN NOTE
Repeat thyroid ultrasound to monitor for any changes in the size or characteristics of the thyroid nodules

## 2023-06-07 NOTE — ASSESSMENT & PLAN NOTE
Alk phos was normal prior to 2017-has been low in the past few years-likely related to antiresorptive

## 2023-06-07 NOTE — ASSESSMENT & PLAN NOTE
Continue calcium and vitamin D supplementations-fall prevention    She will be due for repeat DEXA later this year  Continue Prolia

## 2023-09-01 ENCOUNTER — RA CDI HCC (OUTPATIENT)
Dept: OTHER | Facility: HOSPITAL | Age: 73
End: 2023-09-01

## 2023-09-01 NOTE — PROGRESS NOTES
[facility] coding opportunities       Chart reviewed, no opportunity found: CHART REVIEWED, NO OPPORTUNITY FOUND        Patients Insurance     Medicare Insurance: Medicare

## 2023-09-06 ENCOUNTER — OFFICE VISIT (OUTPATIENT)
Dept: FAMILY MEDICINE CLINIC | Facility: CLINIC | Age: 73
End: 2023-09-06
Payer: MEDICARE

## 2023-09-06 VITALS
HEART RATE: 63 BPM | WEIGHT: 142 LBS | DIASTOLIC BLOOD PRESSURE: 70 MMHG | OXYGEN SATURATION: 98 % | BODY MASS INDEX: 26.13 KG/M2 | HEIGHT: 62 IN | SYSTOLIC BLOOD PRESSURE: 112 MMHG

## 2023-09-06 DIAGNOSIS — E66.3 OVERWEIGHT: ICD-10-CM

## 2023-09-06 DIAGNOSIS — M15.9 GENERALIZED OSTEOARTHRITIS: ICD-10-CM

## 2023-09-06 DIAGNOSIS — Z00.00 MEDICARE ANNUAL WELLNESS VISIT, SUBSEQUENT: Primary | ICD-10-CM

## 2023-09-06 DIAGNOSIS — M81.0 AGE-RELATED OSTEOPOROSIS WITHOUT CURRENT PATHOLOGICAL FRACTURE: ICD-10-CM

## 2023-09-06 DIAGNOSIS — K21.9 GASTROESOPHAGEAL REFLUX DISEASE, UNSPECIFIED WHETHER ESOPHAGITIS PRESENT: ICD-10-CM

## 2023-09-06 DIAGNOSIS — E78.5 HYPERLIPIDEMIA, UNSPECIFIED HYPERLIPIDEMIA TYPE: ICD-10-CM

## 2023-09-06 DIAGNOSIS — R20.2 PARESTHESIAS: ICD-10-CM

## 2023-09-06 DIAGNOSIS — E04.1 THYROID NODULE: ICD-10-CM

## 2023-09-06 DIAGNOSIS — E55.9 VITAMIN D DEFICIENCY: ICD-10-CM

## 2023-09-06 DIAGNOSIS — Z23 ENCOUNTER FOR IMMUNIZATION: ICD-10-CM

## 2023-09-06 PROCEDURE — G0438 PPPS, INITIAL VISIT: HCPCS | Performed by: PHYSICIAN ASSISTANT

## 2023-09-06 PROCEDURE — 99214 OFFICE O/P EST MOD 30 MIN: CPT | Performed by: PHYSICIAN ASSISTANT

## 2023-09-06 PROCEDURE — 90662 IIV NO PRSV INCREASED AG IM: CPT

## 2023-09-06 PROCEDURE — G0008 ADMIN INFLUENZA VIRUS VAC: HCPCS

## 2023-09-06 RX ORDER — VITAMIN B COMPLEX
1 CAPSULE ORAL DAILY
COMMUNITY

## 2023-09-06 NOTE — ASSESSMENT & PLAN NOTE
Currently on 2 caps of red yeast rice daily and omega 3 fish oil 1 cap twice daily. Recheck lipid panel.

## 2023-09-06 NOTE — ASSESSMENT & PLAN NOTE
Currently on Prolia, Ca and Vit D. Continue to follow with Endo. Encouraged to schedule her DEXA as ordered.

## 2023-09-06 NOTE — ASSESSMENT & PLAN NOTE
BMI Counseling: Body mass index is 26.4 kg/m². The BMI is above normal. Nutrition recommendations include 3-5 servings of fruits/vegetables daily. Exercise recommendations include exercising 3-5 times per week.

## 2023-09-06 NOTE — PROGRESS NOTES
Assessment and Plan:     Problem List Items Addressed This Visit        Digestive    Esophageal reflux     Controlled. Continue Nexium 40 mg daily. Endocrine    Thyroid nodule     Patient will be repeating thyroid ultrasound as ordered by Endo. Musculoskeletal and Integument    Osteoporosis     Currently on Prolia, Ca and Vit D. Continue to follow with Endo. Encouraged to schedule her DEXA as ordered. Generalized osteoarthritis     Continue as needed with Ortho (Dr. Hayes Hernandez). Other    Overweight     BMI Counseling: Body mass index is 26.4 kg/m². The BMI is above normal. Nutrition recommendations include 3-5 servings of fruits/vegetables daily. Exercise recommendations include exercising 3-5 times per week. Relevant Orders    CBC and differential    Hyperlipidemia     Currently on 2 caps of red yeast rice daily and omega 3 fish oil 1 cap twice daily. Recheck lipid panel. Relevant Orders    Lipid Panel with Direct LDL reflex    Vitamin D deficiency     On 3000 units daily. Last level was 30.2. Will continue to monitor. Paresthesias     Improved. Can consider trying a wrist splint at bedtime if worsens as it has sounded like possible CTS before. Other Visit Diagnoses     Medicare annual wellness visit, subsequent    -  Primary    Encounter for immunization        Relevant Orders    influenza vaccine, high-dose, PF 0.7 mL (FLUZONE HIGH-DOSE) (Completed)        BMI Counseling: Body mass index is 26.4 kg/m². The BMI is above normal. Nutrition recommendations include encouraging healthy choices of fruits and vegetables. Exercise recommendations include exercising 3-5 times per week. Rationale for BMI follow-up plan is due to patient being overweight or obese. Depression Screening and Follow-up Plan: Patient was screened for depression during today's encounter. They screened negative with a PHQ-2 score of 0.     Falls Plan of Care: balance, strength, and gait training instructions were provided. Preventive health issues were discussed with patient, and age appropriate screening tests were ordered as noted in patient's After Visit Summary. Personalized health advice and appropriate referrals for health education or preventive services given if needed, as noted in patient's After Visit Summary. History of Present Illness:     Patient presents for a Medicare Wellness Visit    GERD - on Nexium 40 mg daily - sees GI annually     Thyroid nodule - needs repeat US next month per Endo    Osteoporosis - on Prolia plus calcium and vitamin-D supplementation - due for next DEXA scan this month    OA - switched to Dr. Rafa Santos of [address] [place] [address] last November - gets no injections and takes ibuprofen PRN    Hyperlpidemia - RYR 2 caps daily plus omega 3 1 cap twice daily    Vit D def - on 3000 units daily - last level was 54.8 in 10/2022    Paresthesias in R thumb - possible CTS - has not tried wrist splints yet - seems better somewhat recently     Patient Care Team:  Stoney Wyatt PA-C as PCP - General (Family Medicine)  Orlando Garner MD as PCP - OBGYN (Obstetrics and Gynecology)  Gracy Brooks MD as PCP - Endocrinology (Endocrinology)  Colleen Starr PA-C as Physician Assistant (Endocrinology)     Review of Systems:     Review of Systems   Constitutional: Negative for chills and fever. HENT: Negative for congestion, rhinorrhea and sore throat. Eyes: Negative for visual disturbance. Respiratory: Negative for cough, shortness of breath and wheezing. Cardiovascular: Negative for chest pain, palpitations and leg swelling. Gastrointestinal: Negative for abdominal pain, constipation, diarrhea, nausea and vomiting. Endocrine: Negative for polydipsia and polyuria. Genitourinary: Negative for dysuria. Musculoskeletal: Positive for arthralgias. Negative for myalgias. Skin: Negative for rash (and sees Derm annually).    Neurological: Negative for dizziness, syncope and headaches. Hematological: Does not bruise/bleed easily. Psychiatric/Behavioral: Negative for dysphoric mood. The patient is not nervous/anxious. Problem List:     Patient Active Problem List   Diagnosis   • Osteoporosis   • Joint pain, knee   • Generalized osteoarthritis   • Esophageal reflux   • Elevated bilirubin   • Midline cystocele   • Overweight   • Hyperlipidemia   • Vitamin D deficiency   • Postmenopausal status   • Thyroid nodule   • Paresthesias   • Low serum alkaline phosphatase      Past Medical and Surgical History:     Past Medical History:   Diagnosis Date   • Osteoporosis      Past Surgical History:   Procedure Laterality Date   • CATARACT EXTRACTION, BILATERAL  01/2020   • COLONOSCOPY      x2   • PERIURETHRAL ABSCESS DRAINAGE  1979      Family History:     Family History   Problem Relation Age of Onset   • Stroke Mother    • Other Mother         pacemaker placed   • Prostate cancer Father 79   • Rheum arthritis Sister    • Osteoporosis Sister    • Diabetes Brother    • Diabetes unspecified Brother    • Hypertension Son    • Eczema Daughter    • No Known Problems Maternal Grandmother    • Lung cancer Maternal Grandfather    • Stomach cancer Paternal Grandmother 80   • Cancer Paternal Grandfather 80        colon?       Social History:     Social History     Socioeconomic History   • Marital status: /Civil Union     Spouse name: None   • Number of children: None   • Years of education: None   • Highest education level: None   Occupational History   • None   Tobacco Use   • Smoking status: Never   • Smokeless tobacco: Never   Vaping Use   • Vaping Use: Never used   Substance and Sexual Activity   • Alcohol use: No   • Drug use: No   • Sexual activity: Yes     Partners: Male     Birth control/protection: None     Comment: Been through menopause   Other Topics Concern   • None   Social History Narrative   • None     Social Determinants of Health     Financial Resource Strain: Low Risk  (8/30/2023)    Overall Financial Resource Strain (CARDIA)    • Difficulty of Paying Living Expenses: Not hard at all   Food Insecurity: Not on file   Transportation Needs: No Transportation Needs (8/30/2023)    PRAPARE - Transportation    • Lack of Transportation (Medical): No    • Lack of Transportation (Non-Medical):  No   Physical Activity: Not on file   Stress: Not on file   Social Connections: Not on file   Intimate Partner Violence: Not on file   Housing Stability: Not on file      Medications and Allergies:     Current Outpatient Medications   Medication Sig Dispense Refill   • b complex vitamins capsule Take 1 capsule by mouth daily     • Calcium Carbonate (CALCIUM 600 PO) Take 700 mg by mouth 2 (two) times a day      • cholecalciferol (VITAMIN D3) 1,000 units tablet Take 3 tablets (3,000 Units total) by mouth daily     • esomeprazole (NexIUM) 40 MG capsule Take 1 capsule by mouth daily     • ibuprofen (MOTRIN) 200 mg tablet Take 200 mg by mouth every 6 (six) hours as needed for mild pain     • NON FORMULARY Take 1 capsule by mouth in the morning CBD OIL CAPSULES       • NON FORMULARY Take 2 capsules by mouth in the morning Core Immune Zinc-Vitamin C       • Omega-3 Fatty Acids (OMEGA-3 CF) 1000 MG CAPS Take 1 capsule by mouth 2 (two) times a day      • Probiotic Product (PROBIOTIC DAILY PO) Take by mouth     • Red Yeast Rice Extract (RED YEAST RICE PO) Take 2 capsules by mouth in the morning     • Turmeric (CURCUMIN 95 PO) Take 400 mg by mouth daily       Current Facility-Administered Medications   Medication Dose Route Frequency Provider Last Rate Last Admin   • denosumab (PROLIA) subcutaneous injection 60 mg  60 mg Subcutaneous Q6 Months Robin Ramos MD   60 mg at 06/07/23 1036     No Known Allergies   Immunizations:     Immunization History   Administered Date(s) Administered   • COVID-19 MODERNA VACC 0.5 ML IM 03/24/2021, 04/23/2021, 11/24/2021   • H1N1, All Formulations 09/18/2014   • INFLUENZA 10/03/2022   • Influenza Split High Dose Preservative Free IM 10/28/2015, 11/21/2016, 08/21/2017, 10/20/2021   • Influenza, high dose seasonal 0.7 mL 10/23/2018, 10/18/2019, 09/17/2020, 10/20/2021, 10/03/2022, 09/06/2023   • Influenza, seasonal, injectable 12/26/2012   • Pneumococcal Conjugate 13-Valent 08/21/2017   • Pneumococcal Polysaccharide PPV23 08/28/2018   • Tdap 01/31/2017   • Tuberculin Skin Test-PPD Intradermal 09/03/2013   • Zoster 12/26/2012   • Zoster Vaccine Recombinant 08/24/2020, 10/26/2020      Health Maintenance:         Topic Date Due   • Colorectal Cancer Screening  08/13/2023   • DXA SCAN  09/15/2023   • Breast Cancer Screening: Mammogram  07/19/2024   • Hepatitis C Screening  Completed         Topic Date Due   • COVID-19 Vaccine (4 - Pearletha  series) 01/19/2022      Medicare Screening Tests and Risk Assessments:     Iona Guy is here for her Subsequent Wellness visit. Health Risk Assessment:   Patient rates overall health as very good. Patient feels that their physical health rating is same. Patient is very satisfied with their life. Eyesight was rated as same. Hearing was rated as same. Patient feels that their emotional and mental health rating is same. Patients states they are never, rarely angry. Patient states they are sometimes unusually tired/fatigued. Pain experienced in the last 7 days has been some. Patient's pain rating has been 6/10. Patient states that she has experienced no weight loss or gain in last 6 months. Depression Screening:   PHQ-2 Score: 0      Fall Risk Screening: In the past year, patient has experienced: history of falling in past year    Number of falls: 1  Injured during fall?: Yes    Feels unsteady when standing or walking?: No    Worried about falling?: No      Urinary Incontinence Screening:   Patient has not leaked urine accidently in the last six months.  Dropped raw eggs on the floor and slipped    Home Safety:  Patient does not have trouble with stairs inside or outside of their home. Patient has working smoke alarms and has no working carbon monoxide detector. Home safety hazards include: none. Nutrition:   Current diet is Regular. Medications:   Patient is currently taking over-the-counter supplements. OTC medications include: Turmeric, Red Yeast Rice, Vitamin B complex, Resveratrol, Omega 3's, Calcium, Vitamin D, Wendy-Immune. Patient is able to manage medications. Activities of Daily Living (ADLs)/Instrumental Activities of Daily Living (IADLs):   Walk and transfer into and out of bed and chair?: Yes  Dress and groom yourself?: Yes    Bathe or shower yourself?: Yes    Feed yourself? Yes  Do your laundry/housekeeping?: Yes  Manage your money, pay your bills and track your expenses?: Yes  Make your own meals?: Yes    Do your own shopping?: Yes    Previous Hospitalizations:   Any hospitalizations or ED visits within the last 12 months?: No      Advance Care Planning:   Living will: Yes    Durable POA for healthcare:  Yes    Advanced directive: Yes      Cognitive Screening:   Provider or family/friend/caregiver concerned regarding cognition?: No    PREVENTIVE SCREENINGS      Cardiovascular Screening:    General: Screening Not Indicated and History Lipid Disorder      Diabetes Screening:     General: Screening Current      Colorectal Cancer Screening:     General: Screening Current      Breast Cancer Screening:     General: Screening Current      Cervical Cancer Screening:    General: Screening Not Indicated      Osteoporosis Screening:    General: Screening Not Indicated and History Osteoporosis      Abdominal Aortic Aneurysm (AAA) Screening:        General: Screening Not Indicated      Lung Cancer Screening:     General: Screening Not Indicated      Hepatitis C Screening:    General: Screening Current    Screening, Brief Intervention, and Referral to Treatment (SBIRT)    Screening  Typical number of drinks in a day: 0  Typical number of drinks in a week: 0  Interpretation: Low risk drinking behavior. AUDIT-C Screenin) How often did you have a drink containing alcohol in the past year? never  2) How many drinks did you have on a typical day when you were drinking in the past year? 0  3) How often did you have 6 or more drinks on one occasion in the past year? never    AUDIT-C Score: 0  Interpretation: Score 0-2 (female): Negative screen for alcohol misuse    Single Item Drug Screening:  How often have you used an illegal drug (including marijuana) or a prescription medication for non-medical reasons in the past year? never    Single Item Drug Screen Score: 0  Interpretation: Negative screen for possible drug use disorder    No results found. Physical Exam:     /70 (BP Location: Left arm, Patient Position: Sitting, Cuff Size: Standard)   Pulse 63   Ht 5' 1.5" (1.562 m)   Wt 64.4 kg (142 lb)   SpO2 98%   BMI 26.40 kg/m²     Physical Exam  Vitals reviewed. Constitutional:       General: She is not in acute distress. Appearance: Normal appearance. She is well-developed. She is not ill-appearing. HENT:      Head: Normocephalic and atraumatic. Right Ear: Tympanic membrane, ear canal and external ear normal. There is no impacted cerumen. Left Ear: Tympanic membrane, ear canal and external ear normal. There is no impacted cerumen. Mouth/Throat:      Mouth: Mucous membranes are moist.      Pharynx: No oropharyngeal exudate or posterior oropharyngeal erythema. Eyes:      Conjunctiva/sclera: Conjunctivae normal.      Pupils: Pupils are equal, round, and reactive to light. Neck:      Thyroid: No thyromegaly. Vascular: No carotid bruit. Cardiovascular:      Rate and Rhythm: Normal rate and regular rhythm. Pulses: Normal pulses. Heart sounds: Normal heart sounds. No murmur heard. Pulmonary:      Effort: Pulmonary effort is normal.      Breath sounds: Normal breath sounds.  No wheezing, rhonchi or rales. Abdominal:      General: Bowel sounds are normal. There is no distension. Palpations: Abdomen is soft. There is no mass. Tenderness: There is no abdominal tenderness. Musculoskeletal:      Cervical back: Normal range of motion and neck supple. Right lower leg: No edema. Left lower leg: No edema. Lymphadenopathy:      Cervical: No cervical adenopathy. Skin:     General: Skin is warm and dry. Findings: No rash. Neurological:      Mental Status: She is alert. Sensory: No sensory deficit. Motor: No weakness. Comments: 5/5 strength in UE and LE   Psychiatric:         Mood and Affect: Mood normal.         Behavior: Behavior normal.         Thought Content:  Thought content normal.         Judgment: Judgment normal.          Xander Luke PA-C

## 2023-09-06 NOTE — PATIENT INSTRUCTIONS
Medicare Preventive Visit Patient Instructions  Thank you for completing your Welcome to Medicare Visit or Medicare Annual Wellness Visit today. Your next wellness visit will be due in one year (9/6/2024). The screening/preventive services that you may require over the next 5-10 years are detailed below. Some tests may not apply to you based off risk factors and/or age. Screening tests ordered at today's visit but not completed yet may show as past due. Also, please note that scanned in results may not display below. Preventive Screenings:  Service Recommendations Previous Testing/Comments   Colorectal Cancer Screening  * Colonoscopy    * Fecal Occult Blood Test (FOBT)/Fecal Immunochemical Test (FIT)  * Fecal DNA/Cologuard Test  * Flexible Sigmoidoscopy Age: 43-73 years old   Colonoscopy: every 10 years (may be performed more frequently if at higher risk)  OR  FOBT/FIT: every 1 year  OR  Cologuard: every 3 years  OR  Sigmoidoscopy: every 5 years  Screening may be recommended earlier than age 39 if at higher risk for colorectal cancer. Also, an individualized decision between you and your healthcare provider will decide whether screening between the ages of 77-80 would be appropriate. Colonoscopy: 08/13/2013  FOBT/FIT: Not on file  Cologuard: Not on file  Sigmoidoscopy: Not on file    Screening Current     Breast Cancer Screening Age: 36 years old  Frequency: every 1-2 years  Not required if history of left and right mastectomy Mammogram: 07/19/2023    Screening Current   Cervical Cancer Screening Between the ages of 21-29, pap smear recommended once every 3 years. Between the ages of 32-69, can perform pap smear with HPV co-testing every 5 years.    Recommendations may differ for women with a history of total hysterectomy, cervical cancer, or abnormal pap smears in past. Pap Smear: Not on file    Screening Not Indicated   Hepatitis C Screening Once for adults born between 1945 and 1965  More frequently in patients at high risk for Hepatitis C Hep C Antibody: 09/06/2017    Screening Current   Diabetes Screening 1-2 times per year if you're at risk for diabetes or have pre-diabetes Fasting glucose: 93 mg/dL (5/24/2023)  A1C: No results in last 5 years (No results in last 5 years)  Screening Current   Cholesterol Screening Once every 5 years if you don't have a lipid disorder. May order more often based on risk factors. Lipid panel: 10/27/2022    Screening Not Indicated  History Lipid Disorder     Other Preventive Screenings Covered by Medicare:  1. Abdominal Aortic Aneurysm (AAA) Screening: covered once if your at risk. You're considered to be at risk if you have a family history of AAA. 2. Lung Cancer Screening: covers low dose CT scan once per year if you meet all of the following conditions: (1) Age 48-67; (2) No signs or symptoms of lung cancer; (3) Current smoker or have quit smoking within the last 15 years; (4) You have a tobacco smoking history of at least 20 pack years (packs per day multiplied by number of years you smoked); (5) You get a written order from a healthcare provider. 3. Glaucoma Screening: covered annually if you're considered high risk: (1) You have diabetes OR (2) Family history of glaucoma OR (3)  aged 48 and older OR (3)  American aged 72 and older  3. Osteoporosis Screening: covered every 2 years if you meet one of the following conditions: (1) You're estrogen deficient and at risk for osteoporosis based off medical history and other findings; (2) Have a vertebral abnormality; (3) On glucocorticoid therapy for more than 3 months; (4) Have primary hyperparathyroidism; (5) On osteoporosis medications and need to assess response to drug therapy. · Last bone density test (DXA Scan): 09/17/2021.  5. HIV Screening: covered annually if you're between the age of 15-65. Also covered annually if you are younger than 13 and older than 72 with risk factors for HIV infection. For pregnant patients, it is covered up to 3 times per pregnancy. Immunizations:  Immunization Recommendations   Influenza Vaccine Annual influenza vaccination during flu season is recommended for all persons aged >= 6 months who do not have contraindications   Pneumococcal Vaccine   * Pneumococcal conjugate vaccine = PCV13 (Prevnar 13), PCV15 (Vaxneuvance), PCV20 (Prevnar 20)  * Pneumococcal polysaccharide vaccine = PPSV23 (Pneumovax) Adults 20-63 years old: 1-3 doses may be recommended based on certain risk factors  Adults 72 years old: 1-2 doses may be recommended based off what pneumonia vaccine you previously received   Hepatitis B Vaccine 3 dose series if at intermediate or high risk (ex: diabetes, end stage renal disease, liver disease)   Tetanus (Td) Vaccine - COST NOT COVERED BY MEDICARE PART B Following completion of primary series, a booster dose should be given every 10 years to maintain immunity against tetanus. Td may also be given as tetanus wound prophylaxis. Tdap Vaccine - COST NOT COVERED BY MEDICARE PART B Recommended at least once for all adults. For pregnant patients, recommended with each pregnancy. Shingles Vaccine (Shingrix) - COST NOT COVERED BY MEDICARE PART B  2 shot series recommended in those aged 48 and above     Health Maintenance Due:      Topic Date Due   • Colorectal Cancer Screening  08/13/2023   • DXA SCAN  09/15/2023   • Breast Cancer Screening: Mammogram  07/19/2024   • Hepatitis C Screening  Completed     Immunizations Due:      Topic Date Due   • COVID-19 Vaccine (4 - Moderna series) 01/19/2022   • Influenza Vaccine (1) 09/01/2023     Advance Directives   What are advance directives? Advance directives are legal documents that state your wishes and plans for medical care. These plans are made ahead of time in case you lose your ability to make decisions for yourself.  Advance directives can apply to any medical decision, such as the treatments you want, and if you want to donate organs. What are the types of advance directives? There are many types of advance directives, and each state has rules about how to use them. You may choose a combination of any of the following:  · Living will: This is a written record of the treatment you want. You can also choose which treatments you do not want, which to limit, and which to stop at a certain time. This includes surgery, medicine, IV fluid, and tube feedings. · Durable power of  for healthcare [facility]): This is a written record that states who you want to make healthcare choices for you when you are unable to make them for yourself. This person, called a proxy, is usually a family member or a friend. You may choose more than 1 proxy. · Do not resuscitate (DNR) order:  A DNR order is used in case your heart stops beating or you stop breathing. It is a request not to have certain forms of treatment, such as CPR. A DNR order may be included in other types of advance directives. · Medical directive: This covers the care that you want if you are in a coma, near death, or unable to make decisions for yourself. You can list the treatments you want for each condition. Treatment may include pain medicine, surgery, blood transfusions, dialysis, IV or tube feedings, and a ventilator (breathing machine). · Values history: This document has questions about your views, beliefs, and how you feel and think about life. This information can help others choose the care that you would choose. Why are advance directives important? An advance directive helps you control your care. Although spoken wishes may be used, it is better to have your wishes written down. Spoken wishes can be misunderstood, or not followed. Treatments may be given even if you do not want them. An advance directive may make it easier for your family to make difficult choices about your care.    Fall Prevention    Fall prevention  includes ways to make your home and other areas safer. It also includes ways you can move more carefully to prevent a fall. Health conditions that cause changes in your blood pressure, vision, or muscle strength and coordination may increase your risk for falls. Medicines may also increase your risk for falls if they make you dizzy, weak, or sleepy. Fall prevention tips:   · Stand or sit up slowly. · Use assistive devices as directed. · Wear shoes that fit well and have soles that . · Wear a personal alarm. · Stay active. · Manage your medical conditions. Home Safety Tips:  · Add items to prevent falls in the bathroom. · Keep paths clear. · Install bright lights in your home. · Keep items you use often on shelves within reach. · Paint or place reflective tape on the edges of your stairs. Weight Management   Why it is important to manage your weight:  Being overweight increases your risk of health conditions such as heart disease, high blood pressure, type 2 diabetes, and certain types of cancer. It can also increase your risk for osteoarthritis, sleep apnea, and other respiratory problems. Aim for a slow, steady weight loss. Even a small amount of weight loss can lower your risk of health problems. How to lose weight safely:  A safe and healthy way to lose weight is to eat fewer calories and get regular exercise. You can lose up about 1 pound a week by decreasing the number of calories you eat by 500 calories each day. Healthy meal plan for weight management:  A healthy meal plan includes a variety of foods, contains fewer calories, and helps you stay healthy. A healthy meal plan includes the following:  · Eat whole-grain foods more often. A healthy meal plan should contain fiber. Fiber is the part of grains, fruits, and vegetables that is not broken down by your body. Whole-grain foods are healthy and provide extra fiber in your diet.  Some examples of whole-grain foods are whole-wheat breads and pastas, oatmeal, brown rice, and bulgur. · Eat a variety of vegetables every day. Include dark, leafy greens such as spinach, kale, diana greens, and mustard greens. Eat yellow and orange vegetables such as carrots, sweet potatoes, and winter squash. · Eat a variety of fruits every day. Choose fresh or canned fruit (canned in its own juice or light syrup) instead of juice. Fruit juice has very little or no fiber. · Eat low-fat dairy foods. Drink fat-free (skim) milk or 1% milk. Eat fat-free yogurt and low-fat cottage cheese. Try low-fat cheeses such as mozzarella and other reduced-fat cheeses. · Choose meat and other protein foods that are low in fat. Choose beans or other legumes such as split peas or lentils. Choose fish, skinless poultry (chicken or turkey), or lean cuts of red meat (beef or pork). Before you cook meat or poultry, cut off any visible fat. · Use less fat and oil. Try baking foods instead of frying them. Add less fat, such as margarine, sour cream, regular salad dressing and mayonnaise to foods. Eat fewer high-fat foods. Some examples of high-fat foods include french fries, doughnuts, ice cream, and cakes. · Eat fewer sweets. Limit foods and drinks that are high in sugar. This includes candy, cookies, regular soda, and sweetened drinks. Exercise:  Exercise at least 30 minutes per day on most days of the week. Some examples of exercise include walking, biking, dancing, and swimming. You can also fit in more physical activity by taking the stairs instead of the elevator or parking farther away from stores. Ask your healthcare provider about the best exercise plan for you. © Copyright [employer] 2018 Information is for End User's use only and may not be sold, redistributed or otherwise used for commercial purposes.  All illustrations and images included in CareNotes® are the copyrighted property of [employer].D.A[employer]M., Inc. or [address] [place] [address]

## 2023-09-06 NOTE — ASSESSMENT & PLAN NOTE
Improved. Can consider trying a wrist splint at bedtime if worsens as it has sounded like possible CTS before.

## 2023-11-09 ENCOUNTER — LAB (OUTPATIENT)
Dept: LAB | Facility: MEDICAL CENTER | Age: 73
End: 2023-11-09
Payer: MEDICARE

## 2023-11-09 DIAGNOSIS — E55.9 VITAMIN D DEFICIENCY: ICD-10-CM

## 2023-11-09 DIAGNOSIS — M81.0 AGE-RELATED OSTEOPOROSIS WITHOUT CURRENT PATHOLOGICAL FRACTURE: ICD-10-CM

## 2023-11-09 DIAGNOSIS — E04.1 THYROID NODULE: ICD-10-CM

## 2023-11-09 DIAGNOSIS — R74.8 LOW SERUM ALKALINE PHOSPHATASE: ICD-10-CM

## 2023-11-09 DIAGNOSIS — E66.3 OVERWEIGHT: ICD-10-CM

## 2023-11-09 DIAGNOSIS — E78.5 HYPERLIPIDEMIA, UNSPECIFIED HYPERLIPIDEMIA TYPE: ICD-10-CM

## 2023-11-09 LAB
25(OH)D3 SERPL-MCNC: 59.4 NG/ML (ref 30–100)
ALBUMIN SERPL BCP-MCNC: 4.4 G/DL (ref 3.5–5)
ALP SERPL-CCNC: 33 U/L (ref 34–104)
ALT SERPL W P-5'-P-CCNC: 20 U/L (ref 7–52)
ANION GAP SERPL CALCULATED.3IONS-SCNC: 9 MMOL/L
AST SERPL W P-5'-P-CCNC: 19 U/L (ref 13–39)
BASOPHILS # BLD AUTO: 0.03 THOUSANDS/ÂΜL (ref 0–0.1)
BASOPHILS NFR BLD AUTO: 1 % (ref 0–1)
BILIRUB SERPL-MCNC: 1.2 MG/DL (ref 0.2–1)
BUN SERPL-MCNC: 20 MG/DL (ref 5–25)
CALCIUM SERPL-MCNC: 9.7 MG/DL (ref 8.4–10.2)
CHLORIDE SERPL-SCNC: 104 MMOL/L (ref 96–108)
CHOLEST SERPL-MCNC: 154 MG/DL
CO2 SERPL-SCNC: 29 MMOL/L (ref 21–32)
CREAT SERPL-MCNC: 0.85 MG/DL (ref 0.6–1.3)
EOSINOPHIL # BLD AUTO: 0.13 THOUSAND/ÂΜL (ref 0–0.61)
EOSINOPHIL NFR BLD AUTO: 3 % (ref 0–6)
ERYTHROCYTE [DISTWIDTH] IN BLOOD BY AUTOMATED COUNT: 14 % (ref 11.6–15.1)
GFR SERPL CREATININE-BSD FRML MDRD: 67 ML/MIN/1.73SQ M
GLUCOSE P FAST SERPL-MCNC: 88 MG/DL (ref 65–99)
HCT VFR BLD AUTO: 39.5 % (ref 34.8–46.1)
HDLC SERPL-MCNC: 85 MG/DL
HGB BLD-MCNC: 12.9 G/DL (ref 11.5–15.4)
IMM GRANULOCYTES # BLD AUTO: 0.01 THOUSAND/UL (ref 0–0.2)
IMM GRANULOCYTES NFR BLD AUTO: 0 % (ref 0–2)
LDLC SERPL CALC-MCNC: 57 MG/DL (ref 0–100)
LYMPHOCYTES # BLD AUTO: 0.96 THOUSANDS/ÂΜL (ref 0.6–4.47)
LYMPHOCYTES NFR BLD AUTO: 23 % (ref 14–44)
MCH RBC QN AUTO: 29.7 PG (ref 26.8–34.3)
MCHC RBC AUTO-ENTMCNC: 32.7 G/DL (ref 31.4–37.4)
MCV RBC AUTO: 91 FL (ref 82–98)
MONOCYTES # BLD AUTO: 0.3 THOUSAND/ÂΜL (ref 0.17–1.22)
MONOCYTES NFR BLD AUTO: 7 % (ref 4–12)
NEUTROPHILS # BLD AUTO: 2.81 THOUSANDS/ÂΜL (ref 1.85–7.62)
NEUTS SEG NFR BLD AUTO: 66 % (ref 43–75)
NRBC BLD AUTO-RTO: 0 /100 WBCS
PHOSPHATE SERPL-MCNC: 3.4 MG/DL (ref 2.3–4.1)
PLATELET # BLD AUTO: 226 THOUSANDS/UL (ref 149–390)
PMV BLD AUTO: 9.4 FL (ref 8.9–12.7)
POTASSIUM SERPL-SCNC: 4.3 MMOL/L (ref 3.5–5.3)
PROT SERPL-MCNC: 7.1 G/DL (ref 6.4–8.4)
PTH-INTACT SERPL-MCNC: 60.2 PG/ML (ref 12–88)
RBC # BLD AUTO: 4.34 MILLION/UL (ref 3.81–5.12)
SODIUM SERPL-SCNC: 142 MMOL/L (ref 135–147)
T4 FREE SERPL-MCNC: 0.79 NG/DL (ref 0.61–1.12)
TRIGL SERPL-MCNC: 58 MG/DL
TSH SERPL DL<=0.05 MIU/L-ACNC: 3.02 UIU/ML (ref 0.45–4.5)
WBC # BLD AUTO: 4.24 THOUSAND/UL (ref 4.31–10.16)

## 2023-11-09 PROCEDURE — 80061 LIPID PANEL: CPT

## 2023-11-09 PROCEDURE — 84439 ASSAY OF FREE THYROXINE: CPT

## 2023-11-09 PROCEDURE — 85025 COMPLETE CBC W/AUTO DIFF WBC: CPT

## 2023-11-09 PROCEDURE — 84100 ASSAY OF PHOSPHORUS: CPT

## 2023-11-09 PROCEDURE — 84443 ASSAY THYROID STIM HORMONE: CPT

## 2023-11-09 PROCEDURE — 84080 ASSAY ALKALINE PHOSPHATASES: CPT

## 2023-11-09 PROCEDURE — 83970 ASSAY OF PARATHORMONE: CPT

## 2023-11-09 PROCEDURE — 80053 COMPREHEN METABOLIC PANEL: CPT

## 2023-11-09 PROCEDURE — 36415 COLL VENOUS BLD VENIPUNCTURE: CPT

## 2023-11-09 PROCEDURE — 82306 VITAMIN D 25 HYDROXY: CPT

## 2023-11-13 ENCOUNTER — HOSPITAL ENCOUNTER (OUTPATIENT)
Dept: BONE DENSITY | Facility: MEDICAL CENTER | Age: 73
Discharge: HOME | End: 2023-11-13
Payer: MEDICARE

## 2023-11-13 ENCOUNTER — HOSPITAL ENCOUNTER (OUTPATIENT)
Dept: ULTRASOUND IMAGING | Facility: MEDICAL CENTER | Age: 73
Discharge: HOME | End: 2023-11-13
Payer: MEDICARE

## 2023-11-13 DIAGNOSIS — M81.0 AGE-RELATED OSTEOPOROSIS WITHOUT CURRENT PATHOLOGICAL FRACTURE: ICD-10-CM

## 2023-11-13 DIAGNOSIS — E04.1 THYROID NODULE: ICD-10-CM

## 2023-11-13 PROCEDURE — 76536 US EXAM OF HEAD AND NECK: CPT

## 2023-11-13 PROCEDURE — 77080 DXA BONE DENSITY AXIAL: CPT

## 2023-11-15 LAB — ALP BONE SERPL-MCNC: 4.6 UG/L

## 2023-11-21 ENCOUNTER — TELEPHONE (OUTPATIENT)
Dept: ENDOCRINOLOGY | Facility: HOSPITAL | Age: 73
End: 2023-11-21

## 2023-11-21 NOTE — TELEPHONE ENCOUNTER
Received summary of benefits for Prolia on 12/11/23. No prior Beckie Man is required. She has met her $226 Deductible. No OOP cost to her.

## 2023-12-11 ENCOUNTER — OFFICE VISIT (OUTPATIENT)
Dept: ENDOCRINOLOGY | Facility: CLINIC | Age: 73
End: 2023-12-11
Payer: MEDICARE

## 2023-12-11 ENCOUNTER — TELEPHONE (OUTPATIENT)
Dept: ENDOCRINOLOGY | Facility: CLINIC | Age: 73
End: 2023-12-11

## 2023-12-11 ENCOUNTER — TELEPHONE (OUTPATIENT)
Dept: ADMINISTRATIVE | Facility: OTHER | Age: 73
End: 2023-12-11

## 2023-12-11 VITALS
SYSTOLIC BLOOD PRESSURE: 120 MMHG | HEIGHT: 62 IN | BODY MASS INDEX: 27.53 KG/M2 | HEART RATE: 64 BPM | DIASTOLIC BLOOD PRESSURE: 84 MMHG | WEIGHT: 149.6 LBS

## 2023-12-11 DIAGNOSIS — E04.1 THYROID NODULE: ICD-10-CM

## 2023-12-11 DIAGNOSIS — M81.0 AGE-RELATED OSTEOPOROSIS WITHOUT CURRENT PATHOLOGICAL FRACTURE: Primary | ICD-10-CM

## 2023-12-11 DIAGNOSIS — E55.9 VITAMIN D DEFICIENCY: ICD-10-CM

## 2023-12-11 DIAGNOSIS — R74.8 LOW SERUM ALKALINE PHOSPHATASE: ICD-10-CM

## 2023-12-11 PROCEDURE — 99214 OFFICE O/P EST MOD 30 MIN: CPT | Performed by: INTERNAL MEDICINE

## 2023-12-11 PROCEDURE — 96372 THER/PROPH/DIAG INJ SC/IM: CPT | Performed by: INTERNAL MEDICINE

## 2023-12-11 NOTE — TELEPHONE ENCOUNTER
----- Message from [employer] [address] [address] sent at 12/11/2023 10:01 AM EST -----  Regarding: CRC  12/11/23 10:01 AM    Hello, our patient Nolan Bridges has had the Cologuard screening done through Dr Chinyere Lucero in [facility]. His number is  [phone].     Thank you,  Rosina Astorga  [facility]

## 2023-12-11 NOTE — ASSESSMENT & PLAN NOTE
Ultrasound was stable 11/13/2023. No additional testing necessary.  Will monitor with physical exam.

## 2023-12-11 NOTE — ASSESSMENT & PLAN NOTE
Continue Prolia every 6 months along with Calcium and Vitamin D supplement. She will get injection today.

## 2023-12-11 NOTE — PROGRESS NOTES
Established Patient Progress Note       Chief Complaint   Patient presents with   • Osteoporosis          Impression & Plan:    Problem List Items Addressed This Visit        Endocrine    Thyroid nodule     Ultrasound was stable 11/13/2023. No additional testing necessary. Will monitor with physical exam.             Musculoskeletal and Integument    Osteoporosis - Primary     Continue Prolia every 6 months along with Calcium and Vitamin D supplement. She will get injection today. Other    Vitamin D deficiency     Continue supplement. Low serum alkaline phosphatase     Due to treatment with antiresorptive. No orders of the defined types were placed in this encounter. History of Present Illness:     Johnathan Galaviz is a 75 y.o. female with a history of  osteoporosis, Vitamin D Deficiency, and Thyroid nodules. For the osteoporosis, she is being treated with Prolia. She got 1 dose in 2017 then restarted in 2020. She is due for injection today. She has had a fall in [employer] parking lot since last visit, but no fractures. Denies problems with prolia. She is making sure to take calcium supplement 600mg per day plus her dietary calcium. She does have chronic back pain. Has a home exercise program, but does not do this. DEXA 11/13/2023 showed  improvement. For Vitamin D Deficiency, she is taking Supplements 3,000 units daily. Denies any kidney stones. For the thyroid nodules, she denies dysphagia. She denies FH of thyroid cancer or radiation therapy to her head/neck. Ultrasound 11/13/2023-- No additional testing needed.          Patient Active Problem List   Diagnosis   • Osteoporosis   • Joint pain, knee   • Generalized osteoarthritis   • Esophageal reflux   • Elevated bilirubin   • Midline cystocele   • Overweight   • Hyperlipidemia   • Vitamin D deficiency   • Postmenopausal status   • Thyroid nodule   • Paresthesias   • Low serum alkaline phosphatase Past Medical History:   Diagnosis Date   • Osteoporosis       Past Surgical History:   Procedure Laterality Date   • CATARACT EXTRACTION, BILATERAL  01/2020   • COLONOSCOPY      x2   • PERIURETHRAL ABSCESS DRAINAGE  1979      Family History   Problem Relation Age of Onset   • Stroke Mother    • Other Mother         pacemaker placed   • Prostate cancer Father 79   • Rheum arthritis Sister    • Osteoporosis Sister    • Diabetes Brother    • Diabetes unspecified Brother    • Hypertension Son    • Eczema Daughter    • No Known Problems Maternal Grandmother    • Lung cancer Maternal Grandfather    • Stomach cancer Paternal Grandmother 80   • Cancer Paternal Grandfather 80        colon?      Social History     Tobacco Use   • Smoking status: Never   • Smokeless tobacco: Never   Substance Use Topics   • Alcohol use: No     No Known Allergies    Current Outpatient Medications:   •  b complex vitamins capsule, Take 1 capsule by mouth daily, Disp: , Rfl:   •  Calcium Carbonate (CALCIUM 600 PO), Take 700 mg by mouth 2 (two) times a day , Disp: , Rfl:   •  cholecalciferol (VITAMIN D3) 1,000 units tablet, Take 3 tablets (3,000 Units total) by mouth daily, Disp: , Rfl:   •  esomeprazole (NexIUM) 40 MG capsule, Take 1 capsule by mouth daily, Disp: , Rfl:   •  ibuprofen (MOTRIN) 200 mg tablet, Take 200 mg by mouth every 6 (six) hours as needed for mild pain, Disp: , Rfl:   •  NON FORMULARY, Take 1 capsule by mouth in the morning CBD OIL CAPSULES  , Disp: , Rfl:   •  NON FORMULARY, Take 2 capsules by mouth in the morning Core Immune Zinc-Vitamin C  , Disp: , Rfl:   •  Omega-3 Fatty Acids (OMEGA-3 CF) 1000 MG CAPS, Take 1 capsule by mouth 2 (two) times a day , Disp: , Rfl:   •  Probiotic Product (PROBIOTIC DAILY PO), Take by mouth, Disp: , Rfl:   •  Red Yeast Rice Extract (RED YEAST RICE PO), Take 2 capsules by mouth in the morning, Disp: , Rfl:   •  Turmeric (CURCUMIN 95 PO), Take 400 mg by mouth daily, Disp: , Rfl:     Current Facility-Administered Medications:   •  denosumab (PROLIA) subcutaneous injection 60 mg, 60 mg, Subcutaneous, Q6 Months, Robin Ramos MD, 60 mg at 12/11/23 0945    Review of Systems   Constitutional:  Negative for activity change, appetite change, chills, diaphoresis, fatigue, fever and unexpected weight change. HENT:  Negative for trouble swallowing and voice change. Eyes:  Negative for visual disturbance. Respiratory:  Negative for shortness of breath. Cardiovascular:  Negative for chest pain and palpitations. Gastrointestinal:  Negative for abdominal pain, constipation and diarrhea. Endocrine: Negative for cold intolerance, heat intolerance, polydipsia, polyphagia and polyuria. Genitourinary:  Negative for frequency and menstrual problem. Musculoskeletal:  Positive for arthralgias and back pain. Negative for myalgias. Skin:  Negative for rash. Allergic/Immunologic: Negative for food allergies. Neurological:  Negative for dizziness and tremors. Hematological:  Negative for adenopathy. Psychiatric/Behavioral:  Negative for sleep disturbance. All other systems reviewed and are negative. Physical Exam:  Body mass index is 27.81 kg/m². /84   Pulse 64   Ht 5' 1.5" (1.562 m)   Wt 67.9 kg (149 lb 9.6 oz)   BMI 27.81 kg/m²    Wt Readings from Last 3 Encounters:   12/11/23 67.9 kg (149 lb 9.6 oz)   09/06/23 64.4 kg (142 lb)   06/07/23 66.5 kg (146 lb 9.6 oz)       Physical Exam  Vitals reviewed. Constitutional:       General: She is not in acute distress. Appearance: Normal appearance. She is well-developed. She is not toxic-appearing. HENT:      Head: Normocephalic and atraumatic. Eyes:      Conjunctiva/sclera: Conjunctivae normal.      Pupils: Pupils are equal, round, and reactive to light. Neck:      Thyroid: No thyromegaly. Cardiovascular:      Rate and Rhythm: Normal rate and regular rhythm. Heart sounds: Normal heart sounds.    Pulmonary:      Effort: Pulmonary effort is normal. No respiratory distress. Breath sounds: Normal breath sounds. No wheezing or rales. Abdominal:      General: Bowel sounds are normal. There is no distension. Palpations: Abdomen is soft. Tenderness: There is no abdominal tenderness. Musculoskeletal:         General: Normal range of motion. Cervical back: Normal range of motion and neck supple. Comments: Wearing back brace. No midline vertebral tenderness. Skin:     General: Skin is warm and dry. Neurological:      Mental Status: She is alert and oriented to person, place, and time.    Psychiatric:         Mood and Affect: Mood normal.         Behavior: Behavior normal.         Labs:   Component      Latest Ref Rn 11/9/2023   WBC      4.31 - 10.16 Thousand/uL 4.24 (L)    Red Blood Cell Count      3.81 - 5.12 Million/uL 4.34    Hemoglobin      11.5 - 15.4 g/dL 12.9    HCT      34.8 - 46.1 % 39.5    MCV      82 - 98 fL 91    MCH      26.8 - 34.3 pg 29.7    MCHC      31.4 - 37.4 g/dL 32.7    RDW      11.6 - 15.1 % 14.0    MPV      8.9 - 12.7 fL 9.4    Platelet Count      668 - 390 Thousands/uL 226    nRBC      /100 WBCs 0    Neutrophils %      43 - 75 % 66    Immat GRANS %      0 - 2 % 0    Lymphocytes Relative      14 - 44 % 23    Monocytes Relative      4 - 12 % 7    Eosinophils      0 - 6 % 3    Basophils Relative      0 - 1 % 1    Absolute Neutrophils      1.85 - 7.62 Thousands/µL 2.81    Immature Grans Absolute      0.00 - 0.20 Thousand/uL 0.01    Lymphocytes Absolute      0.60 - 4.47 Thousands/µL 0.96    Absolute Monocytes      0.17 - 1.22 Thousand/µL 0.30    Absolute Eosinophils      0.00 - 0.61 Thousand/µL 0.13    Basophils Absolute      0.00 - 0.10 Thousands/µL 0.03    Sodium      135 - 147 mmol/L 142    Potassium      3.5 - 5.3 mmol/L 4.3    Chloride      96 - 108 mmol/L 104    CO2      21 - 32 mmol/L 29    Anion Gap      mmol/L 9    BUN      5 - 25 mg/dL 20    Creatinine      0.60 - 1.30 mg/dL 0.85 GLUCOSE FASTING      65 - 99 mg/dL 88    Calcium      8.4 - 10.2 mg/dL 9.7    AST      13 - 39 U/L 19    ALT      7 - 52 U/L 20    Alkaline Phosphatase      34 - 104 U/L 33 (L)    Total Protein      6.4 - 8.4 g/dL 7.1    Albumin      3.5 - 5.0 g/dL 4.4    TOTAL BILIRUBIN      0.20 - 1.00 mg/dL 1.20 (H)    eGFR      ml/min/1.73sq m 67    Cholesterol      See Comment mg/dL 154    Triglycerides      See Comment mg/dL 58    HDL      >=50 mg/dL 85    LDL Calculated      0 - 100 mg/dL 57    TSH 3RD GENERATON      0.450 - 4.500 uIU/mL 3.022    Free T4      0.61 - 1.12 ng/dL 0.79    Vit D, 25-Hydroxy      30.0 - 100.0 ng/mL 59.4    PARATHYROID HORMONE      12.0 - 88.0 pg/mL 60.2    Phosphorus      2.3 - 4.1 mg/dL 3.4    Alk Phosphatase, Bone Specific      ug/L 4.6       Legend:  (L) Low  (H) High  There are no Patient Instructions on file for this visit. Discussed with the patient and all questioned fully answered. She will call me if any problems arise. Follow-up appointment in 6 months.      Counseled patient on diagnostic results, prognosis, risk and benefit of treatment options, instruction for management, importance of treatment compliance, Risk  factor reduction and impressions    Iline Rubinstein, PA-C

## 2023-12-11 NOTE — TELEPHONE ENCOUNTER
Upon review of the In Basket request we have found as a result of outreach that patient did not have the requested item(s) completed. Any additional questions or concerns should be emailed to the Practice Liaisons via the appropriate education email address, please do not reply via In Basket.     Thank you  Dante Betancourt MA

## 2023-12-11 NOTE — PROGRESS NOTES
Assessment/Plan:    Jossie Adams came into the [facility] Endocrinology Office today 12/11/23 to receive her Prolia injection. Verbal consent obtained. Consent given by: patient    patient states patient has been medically healthy with no underlining concerns/complications. Jossie Adams presents with no symptoms today. All insturctions were reviewed with the patient. If the patient should have any questions/concerns, advised patient to contacted [facility] Endocrinology Office. Subjective:     History provided by: patient    Patient ID: Jossie Adams is a 75 y.o. female      Objective:    Vitals:    12/11/23 0951   BP: 120/84   Pulse: 64   Weight: 67.9 kg (149 lb 9.6 oz)   Height: 5' 1.5" (1.562 m)       Patient tolerated the injection well without any complications. Injection site/s Left Arm. Medication was provided by the office. Patient signed consent form yes   Patient signed Nelwyn Lager form yes (If no patient is not a medicare patient). Patient waited 15 minutes after injection no (This only applies to patient's receiving first time injection).        Last Visit: Visit date not found  Next visit:Visit date not found

## 2024-06-18 ENCOUNTER — TELEPHONE (OUTPATIENT)
Dept: ENDOCRINOLOGY | Facility: CLINIC | Age: 74
End: 2024-06-18

## 2024-06-18 ENCOUNTER — OFFICE VISIT (OUTPATIENT)
Dept: ENDOCRINOLOGY | Facility: CLINIC | Age: 74
End: 2024-06-18
Payer: MEDICARE

## 2024-06-18 VITALS
OXYGEN SATURATION: 99 % | HEIGHT: 62 IN | BODY MASS INDEX: 26.79 KG/M2 | WEIGHT: 145.6 LBS | HEART RATE: 64 BPM | SYSTOLIC BLOOD PRESSURE: 100 MMHG | DIASTOLIC BLOOD PRESSURE: 70 MMHG

## 2024-06-18 DIAGNOSIS — E55.9 VITAMIN D DEFICIENCY: ICD-10-CM

## 2024-06-18 DIAGNOSIS — M81.0 AGE-RELATED OSTEOPOROSIS WITHOUT CURRENT PATHOLOGICAL FRACTURE: Primary | ICD-10-CM

## 2024-06-18 DIAGNOSIS — E04.1 THYROID NODULE: ICD-10-CM

## 2024-06-18 PROCEDURE — 96372 THER/PROPH/DIAG INJ SC/IM: CPT | Performed by: PHYSICIAN ASSISTANT

## 2024-06-18 PROCEDURE — 99214 OFFICE O/P EST MOD 30 MIN: CPT | Performed by: PHYSICIAN ASSISTANT

## 2024-06-18 PROCEDURE — 96372 THER/PROPH/DIAG INJ SC/IM: CPT | Performed by: INTERNAL MEDICINE

## 2024-06-18 NOTE — PROGRESS NOTES
Established Patient Progress Note     CC: Endocrinology follow up visit    Impression & Plan:    Problem List Items Addressed This Visit        Endocrine    Thyroid nodule     Ultrasound stable 11/203. Will monitor with physical.exam.          Relevant Orders    TSH, 3rd generation    T4, free       Musculoskeletal and Integument    Osteoporosis - Primary     Continue Prolia every 6 months- she will get injection today  Continue calcium and Vitamin D  Discussed fall prevention and recommend doing her Home exercise program.          Relevant Orders    Comprehensive metabolic panel    TSH, 3rd generation    T4, free    Vitamin D 25 hydroxy       Other    Vitamin D deficiency     Continue supplement.          Relevant Orders    Vitamin D 25 hydroxy         History of Present Illness:     Ema Claudio is a 73 y.o. female with a history of osteoporosis, Vitamin D Deficiency, and Thyroid nodules.     For the osteoporosis, she is being treated with Prolia.  She got 1 dose in 2017 then restarted in 2020. She is due for injection today. She had a fall in the garden recently, but no injury.  She tries to be careful to avoid falls.   Denies problems with prolia. She is making sure to take calcium supplement 600mg per day plus getting her dietary calcium.    She does have chronic back pain. Has a home exercise program, but does not do this.   DEXA 11/13/2023 showed  improvement.      For Vitamin D Deficiency, she is taking Supplements 3,000 units daily.       For the thyroid nodules, she denies dysphagia. She denies FH of thyroid cancer or radiation therapy to her head/neck.  Ultrasound 11/13/2023-- No additional testing needed.     Patient Active Problem List   Diagnosis   • Osteoporosis   • Generalized osteoarthritis   • Esophageal reflux   • Elevated bilirubin   • Midline cystocele   • Overweight   • Hyperlipidemia   • Vitamin D deficiency   • Postmenopausal status   • Thyroid nodule   • Paresthesias   • Low serum  alkaline phosphatase      Past Medical History:   Diagnosis Date   • Osteoporosis       Past Surgical History:   Procedure Laterality Date   • CATARACT EXTRACTION, BILATERAL  01/2020   • COLONOSCOPY      x2   • PERIURETHRAL ABSCESS DRAINAGE  1979      Family History   Problem Relation Age of Onset   • Stroke Mother    • Other Mother         pacemaker placed   • Prostate cancer Father 70   • Rheum arthritis Sister    • Osteoporosis Sister    • Diabetes Brother    • Diabetes unspecified Brother    • Hypertension Son    • Eczema Daughter    • No Known Problems Maternal Grandmother    • Lung cancer Maternal Grandfather    • Stomach cancer Paternal Grandmother 82   • Cancer Paternal Grandfather 80        colon?     Social History     Tobacco Use   • Smoking status: Never   • Smokeless tobacco: Never   Substance Use Topics   • Alcohol use: No     No Known Allergies    Current Outpatient Medications:   •  b complex vitamins capsule, Take 1 capsule by mouth daily, Disp: , Rfl:   •  Calcium Carbonate (CALCIUM 600 PO), Take 700 mg by mouth 2 (two) times a day , Disp: , Rfl:   •  cholecalciferol (VITAMIN D3) 1,000 units tablet, Take 3 tablets (3,000 Units total) by mouth daily, Disp: , Rfl:   •  esomeprazole (NexIUM) 40 MG capsule, Take 1 capsule by mouth daily, Disp: , Rfl:   •  ibuprofen (MOTRIN) 200 mg tablet, Take 200 mg by mouth every 6 (six) hours as needed for mild pain, Disp: , Rfl:   •  NON FORMULARY, Take 2 capsules by mouth in the morning Core Immune Zinc-Vitamin C  , Disp: , Rfl:   •  Omega-3 Fatty Acids (OMEGA-3 CF) 1000 MG CAPS, Take 1 capsule by mouth 2 (two) times a day , Disp: , Rfl:   •  Probiotic Product (PROBIOTIC DAILY PO), Take by mouth, Disp: , Rfl:   •  Red Yeast Rice Extract (RED YEAST RICE PO), Take 2 capsules by mouth in the morning, Disp: , Rfl:   •  Turmeric (CURCUMIN 95 PO), Take 400 mg by mouth daily, Disp: , Rfl:   •  NON FORMULARY, Take 1 capsule by mouth in the morning CBD OIL CAPSULES  ,  "Disp: , Rfl:     Current Facility-Administered Medications:   •  denosumab (PROLIA) subcutaneous injection 60 mg, 60 mg, Subcutaneous, Q6 Months, Robin Ramos MD, 60 mg at 12/11/23 0945    Review of Systems   Constitutional:  Negative for activity change, appetite change, chills, diaphoresis, fatigue, fever and unexpected weight change.   HENT:  Negative for trouble swallowing and voice change.    Eyes:  Negative for visual disturbance.   Respiratory:  Negative for shortness of breath.    Cardiovascular:  Negative for chest pain and palpitations.   Gastrointestinal:  Negative for abdominal pain, constipation and diarrhea.   Endocrine: Negative for cold intolerance, heat intolerance, polydipsia, polyphagia and polyuria.   Genitourinary:  Negative for frequency and menstrual problem.   Musculoskeletal:  Negative for arthralgias and myalgias.   Skin:  Negative for rash.   Allergic/Immunologic: Negative for food allergies.   Neurological:  Negative for dizziness and tremors.   Hematological:  Negative for adenopathy.   Psychiatric/Behavioral:  Negative for sleep disturbance.    All other systems reviewed and are negative.      Physical Exam:  Body mass index is 27.07 kg/m².  /70   Pulse 64   Ht 5' 1.5\" (1.562 m)   Wt 66 kg (145 lb 9.6 oz)   SpO2 99%   BMI 27.07 kg/m²    Wt Readings from Last 3 Encounters:   06/18/24 66 kg (145 lb 9.6 oz)   12/11/23 67.9 kg (149 lb 9.6 oz)   09/06/23 64.4 kg (142 lb)       Physical Exam  Vitals reviewed.   Constitutional:       General: She is not in acute distress.     Appearance: Normal appearance. She is well-developed. She is not toxic-appearing.   HENT:      Head: Normocephalic and atraumatic.   Eyes:      Conjunctiva/sclera: Conjunctivae normal.      Pupils: Pupils are equal, round, and reactive to light.   Neck:      Thyroid: No thyromegaly.   Cardiovascular:      Rate and Rhythm: Normal rate and regular rhythm.      Heart sounds: Normal heart sounds.   Pulmonary:      " Effort: Pulmonary effort is normal. No respiratory distress.      Breath sounds: Normal breath sounds. No wheezing or rales.   Abdominal:      General: Bowel sounds are normal. There is no distension.      Palpations: Abdomen is soft.      Tenderness: There is no abdominal tenderness.   Musculoskeletal:         General: Normal range of motion.      Cervical back: Normal range of motion and neck supple.   Skin:     General: Skin is warm and dry.   Neurological:      Mental Status: She is alert and oriented to person, place, and time.   Psychiatric:         Mood and Affect: Mood normal.         Behavior: Behavior normal.         Labs:   Component      Latest Ref Rng 11/9/2023   WBC      4.31 - 10.16 Thousand/uL 4.24 (L)    RBC      3.81 - 5.12 Million/uL 4.34    Hemoglobin      11.5 - 15.4 g/dL 12.9    Hematocrit      34.8 - 46.1 % 39.5    MCV      82 - 98 fL 91    MCH      26.8 - 34.3 pg 29.7    MCHC      31.4 - 37.4 g/dL 32.7    RDW      11.6 - 15.1 % 14.0    MPV      8.9 - 12.7 fL 9.4    Platelet Count      149 - 390 Thousands/uL 226    nRBC      /100 WBCs 0    Segmented %      43 - 75 % 66    Immature Grans %      0 - 2 % 0    Lymphocytes %      14 - 44 % 23    Monocytes %      4 - 12 % 7    Eosinophils %      0 - 6 % 3    Basophils %      0 - 1 % 1    Absolute Neutrophils      1.85 - 7.62 Thousands/µL 2.81    Absolute Immature Grans      0.00 - 0.20 Thousand/uL 0.01    Absolute Lymphocytes      0.60 - 4.47 Thousands/µL 0.96    Absolute Monocytes      0.17 - 1.22 Thousand/µL 0.30    Absolute Eosinophils      0.00 - 0.61 Thousand/µL 0.13    Absolute Basophils      0.00 - 0.10 Thousands/µL 0.03    Sodium      135 - 147 mmol/L 142    Potassium      3.5 - 5.3 mmol/L 4.3    Chloride      96 - 108 mmol/L 104    Carbon Dioxide      21 - 32 mmol/L 29    ANION GAP      mmol/L 9    BUN      5 - 25 mg/dL 20    Creatinine      0.60 - 1.30 mg/dL 0.85    GLUCOSE, FASTING      65 - 99 mg/dL 88    Calcium      8.4 - 10.2 mg/dL  9.7    AST      13 - 39 U/L 19    ALT      7 - 52 U/L 20    ALK PHOS      34 - 104 U/L 33 (L)    Total Protein      6.4 - 8.4 g/dL 7.1    Albumin      3.5 - 5.0 g/dL 4.4    Total Bilirubin      0.20 - 1.00 mg/dL 1.20 (H)    GFR, Calculated      ml/min/1.73sq m 67    TSH 3RD GENERATON      0.450 - 4.500 uIU/mL 3.022    FREE T4      0.61 - 1.12 ng/dL 0.79    VITAMIN D, 25-HYDROXY      30.0 - 100.0 ng/mL 59.4    PTH      12.0 - 88.0 pg/mL 60.2    Phosphorus      2.3 - 4.1 mg/dL 3.4    Alk Phosphatase, Bone Specific      ug/L 4.6       Legend:  (L) Low  (H) High    Discussed with the patient and all questioned fully answered. She will call me if any problems arise.    Follow-up appointment in 6 months.     Counseled patient on diagnostic results, prognosis, risk and benefit of treatment options, instruction for management, importance of treatment compliance, Risk  factor reduction and impressions    Taylor Howard PA-C

## 2024-06-18 NOTE — ASSESSMENT & PLAN NOTE
Ultrasound stable 11/203. Will monitor with physical.exam.    Normal vision: sees adequately in most situations; can see medication labels, newsprint

## 2024-06-18 NOTE — ASSESSMENT & PLAN NOTE
Continue Prolia every 6 months- she will get injection today  Continue calcium and Vitamin D  Discussed fall prevention and recommend doing her Home exercise program.

## 2024-06-18 NOTE — PROGRESS NOTES
"Assessment/Plan:    Ema Claudio came into the [facility] Endocrinology Office today 06/18/24 to receive prolia injection.      Verbal consent obtained.  Consent given by: patient    patient states patient has been medically healthy with no underlining concerns/complications.      Ema Claudio presents with no symptoms today.       All insturctions were reviewed with the patient.    If the patient should have any questions/concerns, advised patient to contacted [facility] Endocr[facility]ology Office.       Subjective:     History provided by: patient    Patient ID: Ema Claudio is a 73 y.o. female      Objective:    Vitals:    06/18/24 0918   BP: 100/70   Pulse: 64   SpO2: 99%   Weight: 66 kg (145 lb 9.6 oz)   Height: 5' 1.5\" (1.562 m)       Patient tolerated the injection well without any complications.  Injection site/s left arm.  Medication was provided by office.    Patient signed consent form yes   Patient signed ABN form yes (If no patient is not a medicare patient).   Patient waited 15 minutes after injection no (This only applies to patient's receiving first time injection).       Last Visit: Visit date not found  Next visit:6/18/2024     "

## 2024-09-04 ENCOUNTER — TELEPHONE (OUTPATIENT)
Dept: ADMINISTRATIVE | Facility: OTHER | Age: 74
End: 2024-09-04

## 2024-09-04 NOTE — TELEPHONE ENCOUNTER
09/04/24 1:38 PM    Patient contacted to bring Advance Directive, POLST, or Living Will document to next scheduled pcp visit.[facility] Department spoke with patient/ caregiver.    Thank you.  Mary Alice Oshea MA  [facility]

## 2024-09-09 ENCOUNTER — OFFICE VISIT (OUTPATIENT)
Dept: FAMILY MEDICINE CLINIC | Facility: CLINIC | Age: 74
End: 2024-09-09
Payer: MEDICARE

## 2024-09-09 VITALS
HEIGHT: 62 IN | OXYGEN SATURATION: 99 % | SYSTOLIC BLOOD PRESSURE: 122 MMHG | DIASTOLIC BLOOD PRESSURE: 62 MMHG | RESPIRATION RATE: 18 BRPM | HEART RATE: 63 BPM | BODY MASS INDEX: 26.46 KG/M2 | TEMPERATURE: 97.8 F | WEIGHT: 143.8 LBS

## 2024-09-09 DIAGNOSIS — K21.9 GASTROESOPHAGEAL REFLUX DISEASE, UNSPECIFIED WHETHER ESOPHAGITIS PRESENT: ICD-10-CM

## 2024-09-09 DIAGNOSIS — M15.9 GENERALIZED OSTEOARTHRITIS: ICD-10-CM

## 2024-09-09 DIAGNOSIS — E55.9 VITAMIN D DEFICIENCY: ICD-10-CM

## 2024-09-09 DIAGNOSIS — Z23 ENCOUNTER FOR IMMUNIZATION: ICD-10-CM

## 2024-09-09 DIAGNOSIS — E78.5 HYPERLIPIDEMIA, UNSPECIFIED HYPERLIPIDEMIA TYPE: ICD-10-CM

## 2024-09-09 DIAGNOSIS — E04.1 THYROID NODULE: ICD-10-CM

## 2024-09-09 DIAGNOSIS — E66.3 OVERWEIGHT: ICD-10-CM

## 2024-09-09 DIAGNOSIS — H91.93 DECREASED HEARING OF BOTH EARS: ICD-10-CM

## 2024-09-09 DIAGNOSIS — M81.0 AGE-RELATED OSTEOPOROSIS WITHOUT CURRENT PATHOLOGICAL FRACTURE: ICD-10-CM

## 2024-09-09 DIAGNOSIS — Z00.00 MEDICARE ANNUAL WELLNESS VISIT, SUBSEQUENT: Primary | ICD-10-CM

## 2024-09-09 PROCEDURE — 99214 OFFICE O/P EST MOD 30 MIN: CPT | Performed by: PHYSICIAN ASSISTANT

## 2024-09-09 PROCEDURE — 90662 IIV NO PRSV INCREASED AG IM: CPT

## 2024-09-09 PROCEDURE — G0008 ADMIN INFLUENZA VIRUS VAC: HCPCS

## 2024-09-09 PROCEDURE — G0439 PPPS, SUBSEQ VISIT: HCPCS | Performed by: PHYSICIAN ASSISTANT

## 2024-09-09 NOTE — PATIENT INSTRUCTIONS
Medicare Preventive Visit Patient Instructions  Thank you for completing your Welcome to Medicare Visit or Medicare Annual Wellness Visit today. Your next wellness visit will be due in one year (9/10/2025).  The screening/preventive services that you may require over the next 5-10 years are detailed below. Some tests may not apply to you based off risk factors and/or age. Screening tests ordered at today's visit but not completed yet may show as past due. Also, please note that scanned in results may not display below.  Preventive Screenings:  Service Recommendations Previous Testing/Comments   Colorectal Cancer Screening  * Colonoscopy    * Fecal Occult Blood Test (FOBT)/Fecal Immunochemical Test (FIT)  * Fecal DNA/Cologuard Test  * Flexible Sigmoidoscopy Age: 45-75 years old   Colonoscopy: every 10 years (may be performed more frequently if at higher risk)  OR  FOBT/FIT: every 1 year  OR  Cologuard: every 3 years  OR  Sigmoidoscopy: every 5 years  Screening may be recommended earlier than age 45 if at higher risk for colorectal cancer. Also, an individualized decision between you and your healthcare provider will decide whether screening between the ages of 76-85 would be appropriate. Colonoscopy: 08/13/2013  FOBT/FIT: Not on file  Cologuard: 09/25/2023  Sigmoidoscopy: Not on file          Breast Cancer Screening Age: 40+ years old  Frequency: every 1-2 years  Not required if history of left and right mastectomy Mammogram: 07/23/2024    Screening Current   Cervical Cancer Screening Between the ages of 21-29, pap smear recommended once every 3 years.   Between the ages of 30-65, can perform pap smear with HPV co-testing every 5 years.   Recommendations may differ for women with a history of total hysterectomy, cervical cancer, or abnormal pap smears in past. Pap Smear: Not on file    Screening Not Indicated   Hepatitis C Screening Once for adults born between 1945 and 1965  More frequently in patients at high risk  for Hepatitis C Hep C Antibody: 09/06/2017    Screening Current   Diabetes Screening 1-2 times per year if you're at risk for diabetes or have pre-diabetes Fasting glucose: 88 mg/dL (11/9/2023)  A1C: No results in last 5 years (No results in last 5 years)  Screening Current   Cholesterol Screening Once every 5 years if you don't have a lipid disorder. May order more often based on risk factors. Lipid panel: 11/09/2023    Screening Not Indicated  History Lipid Disorder     Other Preventive Screenings Covered by Medicare:  Abdominal Aortic Aneurysm (AAA) Screening: covered once if your at risk. You're considered to be at risk if you have a family history of AAA.  Lung Cancer Screening: covers low dose CT scan once per year if you meet all of the following conditions: (1) Age 55-77; (2) No signs or symptoms of lung cancer; (3) Current smoker or have quit smoking within the last 15 years; (4) You have a tobacco smoking history of at least 20 pack years (packs per day multiplied by number of years you smoked); (5) You get a written order from a healthcare provider.  Glaucoma Screening: covered annually if you're considered high risk: (1) You have diabetes OR (2) Family history of glaucoma OR (3)  aged 50 and older OR (4)  American aged 65 and older  Osteoporosis Screening: covered every 2 years if you meet one of the following conditions: (1) You're estrogen deficient and at risk for osteoporosis based off medical history and other findings; (2) Have a vertebral abnormality; (3) On glucocorticoid therapy for more than 3 months; (4) Have primary hyperparathyroidism; (5) On osteoporosis medications and need to assess response to drug therapy.   Last bone density test (DXA Scan): 11/14/2023.  HIV Screening: covered annually if you're between the age of 15-65. Also covered annually if you are younger than 15 and older than 65 with risk factors for HIV infection. For pregnant patients, it is covered up  to 3 times per pregnancy.    Immunizations:  Immunization Recommendations   Influenza Vaccine Annual influenza vaccination during flu season is recommended for all persons aged >= 6 months who do not have contraindications   Pneumococcal Vaccine   * Pneumococcal conjugate vaccine = PCV13 (Prevnar 13), PCV15 (Vaxneuvance), PCV20 (Prevnar 20)  * Pneumococcal polysaccharide vaccine = PPSV23 (Pneumovax) Adults 19-65 yo with certain risk factors or if 65+ yo  If never received any pneumonia vaccine: recommend Prevnar 20 (PCV20)  Give PCV20 if previously received 1 dose of PCV13 or PPSV23   Hepatitis B Vaccine 3 dose series if at intermediate or high risk (ex: diabetes, end stage renal disease, liver disease)   Respiratory syncytial virus (RSV) Vaccine - COVERED BY MEDICARE PART D  * RSVPreF3 (Arexvy) CDC recommends that adults 60 years of age and older may receive a single dose of RSV vaccine using shared clinical decision-making (SCDM)   Tetanus (Td) Vaccine - COST NOT COVERED BY MEDICARE PART B Following completion of primary series, a booster dose should be given every 10 years to maintain immunity against tetanus. Td may also be given as tetanus wound prophylaxis.   Tdap Vaccine - COST NOT COVERED BY MEDICARE PART B Recommended at least once for all adults. For pregnant patients, recommended with each pregnancy.   Shingles Vaccine (Shingrix) - COST NOT COVERED BY MEDICARE PART B  2 shot series recommended in those 19 years and older who have or will have weakened immune systems or those 50 years and older     Health Maintenance Due:      Topic Date Due   • Breast Cancer Screening: Mammogram  07/23/2025   • DXA SCAN  11/13/2025   • Colorectal Cancer Screening  09/25/2026   • Hepatitis C Screening  Completed     Immunizations Due:      Topic Date Due   • COVID-19 Vaccine (4 - 2023-24 season) 09/01/2024   • Influenza Vaccine (1) 09/01/2024     Advance Directives   What are advance directives?  Advance directives are  legal documents that state your wishes and plans for medical care. These plans are made ahead of time in case you lose your ability to make decisions for yourself. Advance directives can apply to any medical decision, such as the treatments you want, and if you want to donate organs.   What are the types of advance directives?  There are many types of advance directives, and each state has rules about how to use them. You may choose a combination of any of the following:  Living will:  This is a written record of the treatment you want. You can also choose which treatments you do not want, which to limit, and which to stop at a certain time. This includes surgery, medicine, IV fluid, and tube feedings.   Durable power of  for healthcare (DPAHC):  This is a written record that states who you want to make healthcare choices for you when you are unable to make them for yourself. This person, called a proxy, is usually a family member or a friend. You may choose more than 1 proxy.  Do not resuscitate (DNR) order:  A DNR order is used in case your heart stops beating or you stop breathing. It is a request not to have certain forms of treatment, such as CPR. A DNR order may be included in other types of advance directives.  Medical directive:  This covers the care that you want if you are in a coma, near death, or unable to make decisions for yourself. You can list the treatments you want for each condition. Treatment may include pain medicine, surgery, blood transfusions, dialysis, IV or tube feedings, and a ventilator (breathing machine).  Values history:  This document has questions about your views, beliefs, and how you feel and think about life. This information can help others choose the care that you would choose.  Why are advance directives important?  An advance directive helps you control your care. Although spoken wishes may be used, it is better to have your wishes written down. Spoken wishes can be  misunderstood, or not followed. Treatments may be given even if you do not want them. An advance directive may make it easier for your family to make difficult choices about your care.   Fall Prevention    Fall prevention  includes ways to make your home and other areas safer. It also includes ways you can move more carefully to prevent a fall. Health conditions that cause changes in your blood pressure, vision, or muscle strength and coordination may increase your risk for falls. Medicines may also increase your risk for falls if they make you dizzy, weak, or sleepy.   Fall prevention tips:   Stand or sit up slowly.    Use assistive devices as directed.    Wear shoes that fit well and have soles that .    Wear a personal alarm.    Stay active.    Manage your medical conditions.    Home Safety Tips:  Add items to prevent falls in the bathroom.    Keep paths clear.    Install bright lights in your home.    Keep items you use often on shelves within reach.    Paint or place reflective tape on the edges of your stairs.    Weight Management   Why it is important to manage your weight:  Being overweight increases your risk of health conditions such as heart disease, high blood pressure, type 2 diabetes, and certain types of cancer. It can also increase your risk for osteoarthritis, sleep apnea, and other respiratory problems. Aim for a slow, steady weight loss. Even a small amount of weight loss can lower your risk of health problems.  How to lose weight safely:  A safe and healthy way to lose weight is to eat fewer calories and get regular exercise. You can lose up about 1 pound a week by decreasing the number of calories you eat by 500 calories each day.   Healthy meal plan for weight management:  A healthy meal plan includes a variety of foods, contains fewer calories, and helps you stay healthy. A healthy meal plan includes the following:  Eat whole-grain foods more often.  A healthy meal plan should contain  fiber. Fiber is the part of grains, fruits, and vegetables that is not broken down by your body. Whole-grain foods are healthy and provide extra fiber in your diet. Some examples of whole-grain foods are whole-wheat breads and pastas, oatmeal, brown rice, and bulgur.  Eat a variety of vegetables every day.  Include dark, leafy greens such as spinach, kale, diana greens, and mustard greens. Eat yellow and orange vegetables such as carrots, sweet potatoes, and winter squash.   Eat a variety of fruits every day.  Choose fresh or canned fruit (canned in its own juice or light syrup) instead of juice. Fruit juice has very little or no fiber.  Eat low-fat dairy foods.  Drink fat-free (skim) milk or 1% milk. Eat fat-free yogurt and low-fat cottage cheese. Try low-fat cheeses such as mozzarella and other reduced-fat cheeses.  Choose meat and other protein foods that are low in fat.  Choose beans or other legumes such as split peas or lentils. Choose fish, skinless poultry (chicken or turkey), or lean cuts of red meat (beef or pork). Before you cook meat or poultry, cut off any visible fat.   Use less fat and oil.  Try baking foods instead of frying them. Add less fat, such as margarine, sour cream, regular salad dressing and mayonnaise to foods. Eat fewer high-fat foods. Some examples of high-fat foods include french fries, doughnuts, ice cream, and cakes.  Eat fewer sweets.  Limit foods and drinks that are high in sugar. This includes candy, cookies, regular soda, and sweetened drinks.  Exercise:  Exercise at least 30 minutes per day on most days of the week. Some examples of exercise include walking, biking, dancing, and swimming. You can also fit in more physical activity by taking the stairs instead of the elevator or parking farther away from stores. Ask your healthcare provider about the best exercise plan for you.      © Copyright [employer] 2018 Information is for End User's use only and may not be sold,  redistributed or otherwise used for commercial purposes. All illustrations and images included in CareNotes® are the copyrighted property of [employer] or [employer]

## 2024-09-09 NOTE — PROGRESS NOTES
Ambulatory Visit  Name: Ema Claudio      : 1950      MRN: [id number]  Encounter Provider: Solange Lackey PA-C  Encounter Date: 2024   Encounter department: [facility]    Assessment & Plan   1. Medicare annual wellness visit, subsequent  2. Hyperlipidemia, unspecified hyperlipidemia type  Assessment & Plan:  Currently on red yeast rice and omega-3 twice daily.  Recheck lipid panel prior to next visit.  Orders:  -     Lipid Panel with Direct LDL reflex; Future  -     CBC and differential; Future  3. Overweight  Assessment & Plan:  BMI Counseling: Body mass index is 26.73 kg/m². The BMI is above normal. Nutrition recommendations include reducing intake of saturated fat and trans fat. Exercise recommendations include exercising 3-5 times per week.    Orders:  -     CBC and differential; Future  4. Decreased hearing of both ears  -     Ambulatory Referral to Audiology; Future  5. Encounter for immunization  -     influenza vaccine, high-dose, PF 0.5 mL (Fluzone High Dose)  6. Gastroesophageal reflux disease, unspecified whether esophagitis present  Assessment & Plan:  Continue Nexium 40 mg daily and discuss further at upcoming GI appointment.  7. Thyroid nodule  Assessment & Plan:  Had ultrasound in 2023 which showed no need for biopsy or further follow-up.  8. Age-related osteoporosis without current pathological fracture  Assessment & Plan:  On Prolia, calcium, and vitamin D.  Up-to-date with DEXA last done in 2023.  9. Generalized osteoarthritis  Assessment & Plan:  Stable.  Will continue to monitor.  10. Vitamin D deficiency  Assessment & Plan:  Normal in 2023.  Continue current supplementation.  Recheck prior to next visit with labs as previously ordered.       Preventive health issues were discussed with patient, and age appropriate screening tests were ordered as noted in patient's After Visit Summary. Personalized health advice and  appropriate referrals for health education or preventive services given if needed, as noted in patient's After Visit Summary.    History of Present Illness     GERD - on Nexium 40 mg daily and concerned about safety of this - sees GI next week     Thyroid nodule - US in 11/2023 showed no need for bx or f/u    Osteoporosis - on Prolia plus calcium 1200 mg and vitamin-D supplementation - last DEXA in 11/2023    OA - sees Dr. Devries of [facility] when needed- gets injections and takes ibuprofen PRN    Hyperlpidemia - RYR 2 caps daily plus omega 3 1 cap twice daily - LDL was 57 in 11/2023    Vit D def - on 3000 units daily - last level was 59.4 in 11/2023           Patient Care Team:  Solange Lackey PA-C as PCP - General (Family Medicine)  Charmaine Rosario MD as PCP - OBGYN (Obstetrics and Gynecology)  Salena Kerr MD as PCP - Endocrinology (Endocrinology)  Rosaura Howard PA-C as Physician Assistant (Endocrinology)    Review of Systems   Constitutional:  Negative for chills and fever.   HENT:  Negative for congestion, rhinorrhea and sore throat.    Eyes:  Negative for visual disturbance.   Respiratory:  Negative for cough, shortness of breath and wheezing.    Cardiovascular:  Negative for chest pain, palpitations and leg swelling.   Gastrointestinal:  Negative for abdominal pain, blood in stool, constipation, diarrhea, nausea and vomiting.   Genitourinary:  Negative for dysuria and frequency.   Musculoskeletal:  Positive for arthralgias (knee) and back pain (scoliosis). Negative for myalgias.   Skin:  Negative for rash.   Neurological:  Negative for dizziness, syncope and headaches.   Hematological:  Does not bruise/bleed easily.   Psychiatric/Behavioral:  Negative for dysphoric mood. The patient is not nervous/anxious.      Medical History Reviewed by provider this encounter:  Tobacco  Allergies  Meds  Surg Hx  Fam Hx  Soc Hx      Annual Wellness Visit Questionnaire   Ema is here for her Subsequent Wellness  visit.     Health Risk Assessment:   Patient rates overall health as very good. Patient feels that their physical health rating is same. Patient is very satisfied with their life. Eyesight was rated as same. Hearing was rated as slightly worse. Patient feels that their emotional and mental health rating is same. Patients states they are never, rarely angry. Patient states they are never, rarely unusually tired/fatigued. Pain experienced in the last 7 days has been some. Patient's pain rating has been 7/10. Patient states that she has experienced no weight loss or gain in last 6 months.     Depression Screening:   PHQ-2 Score: 0      Fall Risk Screening:   In the past year, patient has experienced: history of falling in past year    Number of falls: 1  Injured during fall?: No    Feels unsteady when standing or walking?: No    Worried about falling?: No      Urinary Incontinence Screening:   Patient has not leaked urine accidently in the last six months. Tripped over dogs once     Home Safety:  Patient does not have trouble with stairs inside or outside of their home. Patient has working smoke alarms and has no working carbon monoxide detector. Home safety hazards include: none.     Nutrition:   Current diet is Regular.     Medications:   Patient is currently taking over-the-counter supplements. OTC medications include: see medication list. Patient is able to manage medications.     Activities of Daily Living (ADLs)/Instrumental Activities of Daily Living (IADLs):   Walk and transfer into and out of bed and chair?: Yes  Dress and groom yourself?: Yes    Bathe or shower yourself?: Yes    Feed yourself? Yes  Do your laundry/housekeeping?: Yes  Manage your money, pay your bills and track your expenses?: Yes  Make your own meals?: Yes    Do your own shopping?: Yes    Previous Hospitalizations:   Any hospitalizations or ED visits within the last 12 months?: No      Advance Care Planning:   Living will: Yes    Durable POA  for healthcare: Yes    Advanced directive: Yes      Cognitive Screening:   Provider or family/friend/caregiver concerned regarding cognition?: No    PREVENTIVE SCREENINGS      Cardiovascular Screening:    General: Screening Not Indicated and History Lipid Disorder      Diabetes Screening:     General: Screening Current      Colorectal Cancer Screening:     General: Screening Current      Breast Cancer Screening:     General: Screening Current      Cervical Cancer Screening:    General: Screening Not Indicated      Osteoporosis Screening:    General: Screening Not Indicated and History Osteoporosis      Abdominal Aortic Aneurysm (AAA) Screening:        General: Screening Not Indicated      Lung Cancer Screening:     General: Screening Not Indicated      Hepatitis C Screening:    General: Screening Current    Screening, Brief Intervention, and Referral to Treatment (SBIRT)    Screening  Typical number of drinks in a day: 0  Typical number of drinks in a week: 0  Interpretation: Low risk drinking behavior.    AUDIT-C Screenin) How often did you have a drink containing alcohol in the past year? never  2) How many drinks did you have on a typical day when you were drinking in the past year? 0  3) How often did you have 6 or more drinks on one occasion in the past year? never    AUDIT-C Score: 0  Interpretation: Score 0-2 (female): Negative screen for alcohol misuse    Single Item Drug Screening:  How often have you used an illegal drug (including marijuana) or a prescription medication for non-medical reasons in the past year? never    Single Item Drug Screen Score: 0  Interpretation: Negative screen for possible drug use disorder    Social Determinants of Health     Financial Resource Strain: Low Risk  (2023)    Overall Financial Resource Strain (CARDIA)     Difficulty of Paying Living Expenses: Not hard at all   Food Insecurity: No Food Insecurity (2024)    Hunger Vital Sign     Worried About Running Out  "of Food in the Last Year: Never true     Ran Out of Food in the Last Year: Never true   Transportation Needs: No Transportation Needs (9/9/2024)    PRAPARE - Transportation     Lack of Transportation (Medical): No     Lack of Transportation (Non-Medical): No   Housing Stability: Low Risk  (9/9/2024)    Housing Stability Vital Sign     Unable to Pay for Housing in the Last Year: No     Number of Times Moved in the Last Year: 0     Homeless in the Last Year: No   Utilities: Not At Risk (9/9/2024)    [facility] Utilities     Threatened with loss of utilities: No     No results found.    Objective     /62 (BP Location: Left arm, Patient Position: Sitting, Cuff Size: Standard)   Pulse 63   Temp 97.8 °F (36.6 °C) (Tympanic)   Resp 18   Ht 5' 1.5\" (1.562 m)   Wt 65.2 kg (143 lb 12.8 oz)   SpO2 99%   BMI 26.73 kg/m²     Physical Exam  Vitals reviewed.   Constitutional:       General: She is not in acute distress.     Appearance: Normal appearance. She is well-developed. She is not ill-appearing.   HENT:      Head: Normocephalic and atraumatic.      Right Ear: External ear normal. There is impacted cerumen.      Left Ear: Tympanic membrane, ear canal and external ear normal.      Mouth/Throat:      Mouth: Mucous membranes are moist.      Pharynx: No oropharyngeal exudate or posterior oropharyngeal erythema.   Eyes:      Conjunctiva/sclera: Conjunctivae normal.      Pupils: Pupils are equal, round, and reactive to light.   Neck:      Thyroid: No thyromegaly.      Vascular: No carotid bruit.   Cardiovascular:      Rate and Rhythm: Normal rate and regular rhythm.      Pulses: Normal pulses.      Heart sounds: Normal heart sounds. No murmur heard.  Pulmonary:      Effort: Pulmonary effort is normal.      Breath sounds: Normal breath sounds. No wheezing, rhonchi or rales.   Abdominal:      General: Bowel sounds are normal. There is no distension.      Palpations: Abdomen is soft. There is no mass.      Tenderness: There is " no abdominal tenderness. There is no guarding.   Musculoskeletal:      Cervical back: Normal range of motion and neck supple.      Right lower leg: No edema.      Left lower leg: No edema.   Lymphadenopathy:      Cervical: No cervical adenopathy.   Skin:     General: Skin is warm and dry.      Findings: No rash.   Neurological:      Mental Status: She is alert.      Sensory: No sensory deficit.      Motor: No weakness.      Comments: 5/5 strength in UE and LE   Psychiatric:         Mood and Affect: Mood normal.         Behavior: Behavior normal.         Thought Content: Thought content normal.         Judgment: Judgment normal.

## 2024-09-22 NOTE — ASSESSMENT & PLAN NOTE
Normal in November 2023.  Continue current supplementation.  Recheck prior to next visit with labs as previously ordered.

## 2024-09-22 NOTE — ASSESSMENT & PLAN NOTE
BMI Counseling: Body mass index is 26.73 kg/m². The BMI is above normal. Nutrition recommendations include reducing intake of saturated fat and trans fat. Exercise recommendations include exercising 3-5 times per week.

## 2024-12-10 ENCOUNTER — APPOINTMENT (OUTPATIENT)
Dept: LAB | Facility: MEDICAL CENTER | Age: 74
End: 2024-12-10
Payer: MEDICARE

## 2024-12-10 ENCOUNTER — RESULTS FOLLOW-UP (OUTPATIENT)
Dept: ENDOCRINOLOGY | Facility: CLINIC | Age: 74
End: 2024-12-10

## 2024-12-10 DIAGNOSIS — E55.9 VITAMIN D DEFICIENCY: ICD-10-CM

## 2024-12-10 DIAGNOSIS — E04.1 THYROID NODULE: ICD-10-CM

## 2024-12-10 DIAGNOSIS — M81.0 AGE-RELATED OSTEOPOROSIS WITHOUT CURRENT PATHOLOGICAL FRACTURE: ICD-10-CM

## 2024-12-10 LAB
25(OH)D3 SERPL-MCNC: 57.5 NG/ML (ref 30–100)
ALBUMIN SERPL BCG-MCNC: 4.6 G/DL (ref 3.5–5)
ALP SERPL-CCNC: 36 U/L (ref 34–104)
ALT SERPL W P-5'-P-CCNC: 16 U/L (ref 7–52)
ANION GAP SERPL CALCULATED.3IONS-SCNC: 9 MMOL/L (ref 4–13)
AST SERPL W P-5'-P-CCNC: 20 U/L (ref 13–39)
BILIRUB SERPL-MCNC: 1.38 MG/DL (ref 0.2–1)
BUN SERPL-MCNC: 19 MG/DL (ref 5–25)
CALCIUM SERPL-MCNC: 9.6 MG/DL (ref 8.4–10.2)
CHLORIDE SERPL-SCNC: 101 MMOL/L (ref 96–108)
CO2 SERPL-SCNC: 30 MMOL/L (ref 21–32)
CREAT SERPL-MCNC: 0.76 MG/DL (ref 0.6–1.3)
GFR SERPL CREATININE-BSD FRML MDRD: 76 ML/MIN/1.73SQ M
GLUCOSE P FAST SERPL-MCNC: 94 MG/DL (ref 65–99)
POTASSIUM SERPL-SCNC: 4.1 MMOL/L (ref 3.5–5.3)
PROT SERPL-MCNC: 6.8 G/DL (ref 6.4–8.4)
SODIUM SERPL-SCNC: 140 MMOL/L (ref 135–147)
T4 FREE SERPL-MCNC: 0.88 NG/DL (ref 0.61–1.12)
TSH SERPL DL<=0.05 MIU/L-ACNC: 3 UIU/ML (ref 0.45–4.5)

## 2024-12-10 PROCEDURE — 36415 COLL VENOUS BLD VENIPUNCTURE: CPT

## 2024-12-10 PROCEDURE — 82306 VITAMIN D 25 HYDROXY: CPT

## 2024-12-10 PROCEDURE — 80053 COMPREHEN METABOLIC PANEL: CPT

## 2024-12-10 PROCEDURE — 84439 ASSAY OF FREE THYROXINE: CPT

## 2024-12-10 PROCEDURE — 84443 ASSAY THYROID STIM HORMONE: CPT

## 2024-12-20 ENCOUNTER — OFFICE VISIT (OUTPATIENT)
Dept: ENDOCRINOLOGY | Facility: CLINIC | Age: 74
End: 2024-12-20
Payer: MEDICARE

## 2024-12-20 VITALS
WEIGHT: 138 LBS | HEART RATE: 68 BPM | SYSTOLIC BLOOD PRESSURE: 110 MMHG | HEIGHT: 63 IN | DIASTOLIC BLOOD PRESSURE: 80 MMHG | OXYGEN SATURATION: 98 % | BODY MASS INDEX: 24.45 KG/M2

## 2024-12-20 DIAGNOSIS — E55.9 VITAMIN D DEFICIENCY: ICD-10-CM

## 2024-12-20 DIAGNOSIS — M81.0 AGE-RELATED OSTEOPOROSIS WITHOUT CURRENT PATHOLOGICAL FRACTURE: Primary | ICD-10-CM

## 2024-12-20 DIAGNOSIS — E04.1 THYROID NODULE: ICD-10-CM

## 2024-12-20 DIAGNOSIS — R74.8 LOW SERUM ALKALINE PHOSPHATASE: ICD-10-CM

## 2024-12-20 PROCEDURE — 96372 THER/PROPH/DIAG INJ SC/IM: CPT

## 2024-12-20 PROCEDURE — 99214 OFFICE O/P EST MOD 30 MIN: CPT | Performed by: INTERNAL MEDICINE

## 2024-12-20 PROCEDURE — 96372 THER/PROPH/DIAG INJ SC/IM: CPT | Performed by: INTERNAL MEDICINE

## 2024-12-20 NOTE — PROGRESS NOTES
Ema Claudio 74 y.o. female MRN: [id number]    Encounter: [id number]      Assessment & Plan     Problem List Items Addressed This Visit          Endocrine    Thyroid nodule    Cystic/spongiform thyroid nodules-continue to monitor clinically         Relevant Orders    TSH, 3rd generation       Musculoskeletal and Integument    Osteoporosis - Primary    If getting 2 servings of calcium in diet , cut back to  calcium supplement to 600 mg daily  Continue vitamin D supplementations, fall prevention.  She will receive Prolia today and will be due for DEXA scan November 2025         Relevant Orders    PTH, intact    Phosphorus    Comprehensive metabolic panel       Other    Low serum alkaline phosphatase    Patient on antiresorptive         Vitamin D deficiency    Continue vitamin D supplementations         Relevant Orders    Vitamin D 25 hydroxy        CC:   Osteoporosis    History of Present Illness     HPI:  74 y.o. female with a history of osteoporosis, Vitamin D Deficiency, and Thyroid nodules seen in follow-up     For the osteoporosis, she is being treated with Prolia.  She got 1 dose in 2017 then restarted in 2020.    Calcium 2 SERVINGS of dairy a day and 1200 mg daily in supplementations  Vitamin D3 2000 IU DAILY       No falls /frcatures since last visit    Denies any obstructive symptoms  Review of Systems    Historical Information   Past Medical History:   Diagnosis Date    Arthritis 2013    GERD (gastroesophageal reflux disease) 2004    Osteoporosis      Past Surgical History:   Procedure Laterality Date    CATARACT EXTRACTION, BILATERAL  01/2020    COLONOSCOPY      x2    EYE SURGERY  1/2020    PERIURETHRAL ABSCESS DRAINAGE  1979     Social History   Social History     Substance and Sexual Activity   Alcohol Use No     Social History     Substance and Sexual Activity   Drug Use No     Social History     Tobacco Use   Smoking Status Never   Smokeless Tobacco Never     Family History:   Family History  "  Problem Relation Age of Onset    Stroke Mother     Other Mother         pacemaker placed    Prostate cancer Father 70    Cancer Father         prostate cancer    Rheum arthritis Sister     Osteoporosis Sister     Diabetes Brother     Diabetes unspecified Brother     Hypertension Son     Eczema Daughter     Arthritis Maternal Grandmother     Lung cancer Maternal Grandfather     Cancer Maternal Grandfather         lung cancer    Stomach cancer Paternal Grandmother 82    Cancer Paternal Grandmother         stomach    Cancer Paternal Grandfather 80        colon cancer       Meds/Allergies   Current Outpatient Medications   Medication Sig Dispense Refill    b complex vitamins capsule Take 1 capsule by mouth daily      Calcium Carbonate (CALCIUM 600 PO) Take 700 mg by mouth 2 (two) times a day       cholecalciferol (VITAMIN D3) 1,000 units tablet Take 3 tablets (3,000 Units total) by mouth daily      esomeprazole (NexIUM) 40 MG capsule Take 1 capsule by mouth daily      ibuprofen (MOTRIN) 200 mg tablet Take 200 mg by mouth every 6 (six) hours as needed for mild pain      NON FORMULARY Take 1 capsule by mouth in the morning CBD OIL CAPSULES        NON FORMULARY Take 2 capsules by mouth in the morning Core Immune Zinc-Vitamin C        Omega-3 Fatty Acids (OMEGA-3 CF) 1000 MG CAPS Take 1 capsule by mouth 2 (two) times a day       Probiotic Product (PROBIOTIC DAILY PO) Take by mouth      Red Yeast Rice Extract (RED YEAST RICE PO) Take 2 capsules by mouth in the morning      Turmeric (CURCUMIN 95 PO) Take 400 mg by mouth daily       Current Facility-Administered Medications   Medication Dose Route Frequency Provider Last Rate Last Admin    denosumab (PROLIA) subcutaneous injection 60 mg  60 mg Subcutaneous Q6 Months Robin Ramos MD   60 mg at 12/20/24 1116     No Known Allergies    Objective   Vitals: Blood pressure 110/80, pulse 68, height 5' 2.5\" (1.588 m), weight 62.6 kg (138 lb), SpO2 98%.    Physical Exam  Vitals " reviewed.   Constitutional:       General: She is not in acute distress.     Appearance: Normal appearance. She is obese. She is not ill-appearing, toxic-appearing or diaphoretic.   HENT:      Head: Normocephalic and atraumatic.   Eyes:      General: No scleral icterus.     Extraocular Movements: Extraocular movements intact.   Neck:      Comments: Bilateral thyroid nodules palpated  Cardiovascular:      Rate and Rhythm: Normal rate and regular rhythm.      Heart sounds: Normal heart sounds. No murmur heard.  Pulmonary:      Effort: Pulmonary effort is normal. No respiratory distress.      Breath sounds: Normal breath sounds. No wheezing or rales.   Abdominal:      General: There is no distension.      Palpations: Abdomen is soft.      Tenderness: There is no abdominal tenderness.   Musculoskeletal:      Cervical back: Neck supple.      Right lower leg: No edema.      Left lower leg: No edema.   Lymphadenopathy:      Cervical: No cervical adenopathy.   Skin:     General: Skin is warm and dry.   Neurological:      General: No focal deficit present.      Mental Status: She is alert and oriented to person, place, and time.      Gait: Gait normal.   Psychiatric:         Mood and Affect: Mood normal.         Behavior: Behavior normal.         Thought Content: Thought content normal.         Judgment: Judgment normal.         The history was obtained from the review of the chart, patient.    Lab Results:   Lab Results   Component Value Date/Time    Potassium 4.1 12/10/2024 09:57 AM    Chloride 101 12/10/2024 09:57 AM    CO2 30 12/10/2024 09:57 AM    BUN 19 12/10/2024 09:57 AM    Creatinine 0.76 12/10/2024 09:57 AM    Glucose, Fasting 94 12/10/2024 09:57 AM    Calcium 9.6 12/10/2024 09:57 AM    eGFR 76 12/10/2024 09:57 AM    TSH 3RD GENERATON 3.003 12/10/2024 09:57 AM    Free T4 0.88 12/10/2024 09:57 AM    Vit D, 25-Hydroxy 57.5 12/10/2024 09:57 AM         Imaging Studies:         Results for orders placed during the  "hospital encounter of 11/13/23    DXA bone density spine hip and pelvis    Impression  1. Based on the WHO classification, this study identifies a diagnosis of low bone mass, notable at the spine, total hip, femoral neck, and forearm areas which have improved with treatment from the diagnosis of osteoporosis and the patient remains at  risk for fracture.    2. A daily intake of calcium of at least 1200 mg and vitamin D, 800-1000 IU, as well as weight bearing and muscle strengthening exercise, fall prevention and avoidance of tobacco and excessive alcohol intake as basic preventive measures are recommended.    3. Repeat DXA scan on the same equipment in 18-24 months as clinically indicated.      The 10 year risk of hip fracture is 3.3%, with the 10 year risk of major osteoporotic fracture being 13%, as calculated by the WHO fracture risk assessment tool (FRAX).  The current NOF guidelines recommend treating patients with FRAX 10 year risk score  of >3% for hip fracture and >20% for major osteoporotic fracture.    Hip fracture risk exceeds treatment thresholds on the FRAX score.    WHO CLASSIFICATION:  Normal (a T-score of -1.0 or higher)  Low bone mineral density (a T-score of less than -1.0 but higher than -2.5)  Osteoporosis (a T-score of -2.5 or less)  Severe osteoporosis (a T-score of -2.5 or less with a fragility fracture)    Thank you for allowing us the opportunity to participate in your patient care.    The expanded DEXA report will no longer be arriving in your mail. If you desire to view the full report please contact [facility] medical records or access the PACS system.          Workstation performed: [id number]            Results Review Statement: I reviewed radiology reports from this admission including: DEXA scan.    Portions of the record may have been created with voice recognition software. Occasional wrong word or \"sound a like\" substitutions may have occurred due to the inherent limitations of " voice recognition software. Read the chart carefully and recognize, using context, where substitutions have occurred.

## 2024-12-20 NOTE — ASSESSMENT & PLAN NOTE
If getting 2 servings of calcium in diet , cut back to  calcium supplement to 600 mg daily  Continue vitamin D supplementations, fall prevention.  She will receive Prolia today and will be due for DEXA scan November 2025

## 2024-12-20 NOTE — PROGRESS NOTES
"Assessment/Plan:    Ema Claudio came into the [facility] Endocrinology Office today 12/20/24 to receive Prolia injection.      Verbal consent obtained.  Consent given by: patient    patient states patient has been medically healthy with no underlining concerns/complications.      Ema Claudio presents with no symptoms today.       All insturctions were reviewed with the patient.    If the patient should have any questions/concerns, advised patient to contacted [facility] Endocrinology Office.       Subjective:     History provided by: patient    Patient ID: Ema Claudio is a 74 y.o. female      Objective:    Vitals:    12/20/24 1056   BP: 110/80   Pulse: 68   SpO2: 98%   Weight: 62.6 kg (138 lb)   Height: 5' 2.5\" (1.588 m)       Patient tolerated the injection well without any complications.  Injection site/s left arm.  Medication was provided by office.    Patient signed consent form yes   Patient signed ABN form yes (If no patient is not a medicare patient).   Patient waited 15 minutes after injection no (This only applies to patient's receiving first time injection).       Last Visit: Visit date not found  Next visit:Visit date not found     "

## 2025-06-10 ENCOUNTER — APPOINTMENT (OUTPATIENT)
Dept: LAB | Facility: MEDICAL CENTER | Age: 75
End: 2025-06-10
Payer: MEDICARE

## 2025-06-10 DIAGNOSIS — E55.9 VITAMIN D DEFICIENCY: ICD-10-CM

## 2025-06-10 DIAGNOSIS — M81.0 AGE-RELATED OSTEOPOROSIS WITHOUT CURRENT PATHOLOGICAL FRACTURE: ICD-10-CM

## 2025-06-10 DIAGNOSIS — E78.5 HYPERLIPIDEMIA, UNSPECIFIED HYPERLIPIDEMIA TYPE: ICD-10-CM

## 2025-06-10 DIAGNOSIS — E04.1 THYROID NODULE: ICD-10-CM

## 2025-06-10 DIAGNOSIS — E66.3 OVERWEIGHT: ICD-10-CM

## 2025-06-10 LAB
25(OH)D3 SERPL-MCNC: 54.8 NG/ML (ref 30–100)
ALBUMIN SERPL BCG-MCNC: 4.5 G/DL (ref 3.5–5)
ALP SERPL-CCNC: 35 U/L (ref 34–104)
ALT SERPL W P-5'-P-CCNC: 15 U/L (ref 7–52)
ANION GAP SERPL CALCULATED.3IONS-SCNC: 11 MMOL/L (ref 4–13)
AST SERPL W P-5'-P-CCNC: 17 U/L (ref 13–39)
BASOPHILS # BLD AUTO: 0.04 THOUSANDS/ÂΜL (ref 0–0.1)
BASOPHILS NFR BLD AUTO: 1 % (ref 0–1)
BILIRUB SERPL-MCNC: 1.25 MG/DL (ref 0.2–1)
BUN SERPL-MCNC: 19 MG/DL (ref 5–25)
CALCIUM SERPL-MCNC: 9.8 MG/DL (ref 8.4–10.2)
CHLORIDE SERPL-SCNC: 101 MMOL/L (ref 96–108)
CHOLEST SERPL-MCNC: 178 MG/DL (ref ?–200)
CO2 SERPL-SCNC: 28 MMOL/L (ref 21–32)
CREAT SERPL-MCNC: 0.72 MG/DL (ref 0.6–1.3)
EOSINOPHIL # BLD AUTO: 0.12 THOUSAND/ÂΜL (ref 0–0.61)
EOSINOPHIL NFR BLD AUTO: 2 % (ref 0–6)
ERYTHROCYTE [DISTWIDTH] IN BLOOD BY AUTOMATED COUNT: 13.8 % (ref 11.6–15.1)
GFR SERPL CREATININE-BSD FRML MDRD: 82 ML/MIN/1.73SQ M
GLUCOSE P FAST SERPL-MCNC: 88 MG/DL (ref 65–99)
HCT VFR BLD AUTO: 38.9 % (ref 34.8–46.1)
HDLC SERPL-MCNC: 78 MG/DL
HGB BLD-MCNC: 13.2 G/DL (ref 11.5–15.4)
IMM GRANULOCYTES # BLD AUTO: 0.02 THOUSAND/UL (ref 0–0.2)
IMM GRANULOCYTES NFR BLD AUTO: 0 % (ref 0–2)
LDLC SERPL CALC-MCNC: 80 MG/DL (ref 0–100)
LYMPHOCYTES # BLD AUTO: 0.86 THOUSANDS/ÂΜL (ref 0.6–4.47)
LYMPHOCYTES NFR BLD AUTO: 18 % (ref 14–44)
MCH RBC QN AUTO: 29.9 PG (ref 26.8–34.3)
MCHC RBC AUTO-ENTMCNC: 33.9 G/DL (ref 31.4–37.4)
MCV RBC AUTO: 88 FL (ref 82–98)
MONOCYTES # BLD AUTO: 0.33 THOUSAND/ÂΜL (ref 0.17–1.22)
MONOCYTES NFR BLD AUTO: 7 % (ref 4–12)
NEUTROPHILS # BLD AUTO: 3.54 THOUSANDS/ÂΜL (ref 1.85–7.62)
NEUTS SEG NFR BLD AUTO: 72 % (ref 43–75)
NRBC BLD AUTO-RTO: 0 /100 WBCS
PHOSPHATE SERPL-MCNC: 3.7 MG/DL (ref 2.3–4.1)
PLATELET # BLD AUTO: 225 THOUSANDS/UL (ref 149–390)
PMV BLD AUTO: 9.3 FL (ref 8.9–12.7)
POTASSIUM SERPL-SCNC: 4.4 MMOL/L (ref 3.5–5.3)
PROT SERPL-MCNC: 7.1 G/DL (ref 6.4–8.4)
PTH-INTACT SERPL-MCNC: 54.2 PG/ML (ref 12–88)
RBC # BLD AUTO: 4.42 MILLION/UL (ref 3.81–5.12)
SODIUM SERPL-SCNC: 140 MMOL/L (ref 135–147)
TRIGL SERPL-MCNC: 102 MG/DL (ref ?–150)
TSH SERPL DL<=0.05 MIU/L-ACNC: 3.31 UIU/ML (ref 0.45–4.5)
WBC # BLD AUTO: 4.91 THOUSAND/UL (ref 4.31–10.16)

## 2025-06-10 PROCEDURE — 85025 COMPLETE CBC W/AUTO DIFF WBC: CPT

## 2025-06-10 PROCEDURE — 80061 LIPID PANEL: CPT

## 2025-06-10 PROCEDURE — 82306 VITAMIN D 25 HYDROXY: CPT

## 2025-06-10 PROCEDURE — 84100 ASSAY OF PHOSPHORUS: CPT

## 2025-06-10 PROCEDURE — 83970 ASSAY OF PARATHORMONE: CPT

## 2025-06-10 PROCEDURE — 80053 COMPREHEN METABOLIC PANEL: CPT

## 2025-06-10 PROCEDURE — 84443 ASSAY THYROID STIM HORMONE: CPT

## 2025-06-10 PROCEDURE — 36415 COLL VENOUS BLD VENIPUNCTURE: CPT

## 2025-06-12 ENCOUNTER — RESULTS FOLLOW-UP (OUTPATIENT)
Dept: ENDOCRINOLOGY | Facility: CLINIC | Age: 75
End: 2025-06-12

## 2025-06-12 ENCOUNTER — RESULTS FOLLOW-UP (OUTPATIENT)
Dept: FAMILY MEDICINE CLINIC | Facility: CLINIC | Age: 75
End: 2025-06-12

## 2025-07-02 ENCOUNTER — OFFICE VISIT (OUTPATIENT)
Dept: ENDOCRINOLOGY | Facility: CLINIC | Age: 75
End: 2025-07-02
Payer: MEDICARE

## 2025-07-02 VITALS
HEART RATE: 72 BPM | BODY MASS INDEX: 24.84 KG/M2 | WEIGHT: 135 LBS | OXYGEN SATURATION: 98 % | HEIGHT: 62 IN | DIASTOLIC BLOOD PRESSURE: 70 MMHG | SYSTOLIC BLOOD PRESSURE: 110 MMHG

## 2025-07-02 DIAGNOSIS — E55.9 VITAMIN D DEFICIENCY: ICD-10-CM

## 2025-07-02 DIAGNOSIS — M81.0 AGE-RELATED OSTEOPOROSIS WITHOUT CURRENT PATHOLOGICAL FRACTURE: Primary | ICD-10-CM

## 2025-07-02 DIAGNOSIS — E04.1 THYROID NODULE: ICD-10-CM

## 2025-07-02 PROCEDURE — 99214 OFFICE O/P EST MOD 30 MIN: CPT | Performed by: INTERNAL MEDICINE

## 2025-07-02 PROCEDURE — G2211 COMPLEX E/M VISIT ADD ON: HCPCS | Performed by: INTERNAL MEDICINE

## 2025-07-02 PROCEDURE — 96372 THER/PROPH/DIAG INJ SC/IM: CPT | Performed by: INTERNAL MEDICINE

## 2025-07-02 NOTE — ASSESSMENT & PLAN NOTE
Continue vitamin D supplementations  Orders:    DXA bone density spine hip and pelvis; Future    Basic metabolic panel; Future

## 2025-07-02 NOTE — PROGRESS NOTES
"Name: Ema Claudio      : 1950      MRN: [id number]  Encounter Provider: Salena Kerr MD  Encounter Date: 2025   Encounter department: [facility] [place]    Chief Complaint   Patient presents with    Osteoporosis    thyroid nodule   :  Assessment & Plan  Age-related osteoporosis without current pathological fracture  Advised to cut back on calcium supplementation as she seems to be getting at least 2 servings of dairy in her diet-continue vitamin D supplementation.  Fall prevention.  She will receive Prolia today, repeat DEXA prior to next visit  Orders:    DXA bone density spine hip and pelvis; Future    Basic metabolic panel; Future    Vitamin D deficiency  Continue vitamin D supplementations  Orders:    DXA bone density spine hip and pelvis; Future    Basic metabolic panel; Future    Thyroid nodule  Will monitor clinically  Orders:    TSH, 3rd generation; Future            History of Present Illness     Ema Claudio is a 75 y.o. female  with a history of osteoporosis, Vitamin D Deficiency, and Thyroid nodules seen in follow-up     For the osteoporosis, she is being treated with Prolia.  She got 1 dose in  then restarted in .  Last dose 2024    No falls /fractures since last visit     No obstructive symptoms     Takes calcium  in diet and supplements     Review of Systems as per HPI  Medications Ordered Prior to Encounter[1]   Social History[2]     Medical History Reviewed by provider this encounter:  Meds     .    Objective   /70   Pulse 72   Ht 5' 2\" (1.575 m)   Wt 61.2 kg (135 lb)   SpO2 98%   BMI 24.69 kg/m²      Body mass index is 24.69 kg/m².  Wt Readings from Last 3 Encounters:   25 61.2 kg (135 lb)   24 62.6 kg (138 lb)   24 65.2 kg (143 lb 12.8 oz)     Physical Exam  Vitals reviewed.   Constitutional:       General: She is not in acute distress.     Appearance: Normal appearance. She is not " ill-appearing, toxic-appearing or diaphoretic.   HENT:      Head: Normocephalic and atraumatic.     Eyes:      General: No scleral icterus.     Extraocular Movements: Extraocular movements intact.       Cardiovascular:      Rate and Rhythm: Normal rate and regular rhythm.      Heart sounds: Normal heart sounds. No murmur heard.  Pulmonary:      Effort: Pulmonary effort is normal. No respiratory distress.      Breath sounds: Normal breath sounds. No wheezing or rales.     Musculoskeletal:      Cervical back: Neck supple.      Right lower leg: No edema.      Left lower leg: No edema.   Lymphadenopathy:      Cervical: No cervical adenopathy.     Skin:     General: Skin is warm and dry.     Neurological:      General: No focal deficit present.      Mental Status: She is alert and oriented to person, place, and time.     Psychiatric:         Mood and Affect: Mood normal.         Behavior: Behavior normal.         Labs: I have reviewed pertinent labs including:   HDL, Direct   Date Value Ref Range Status   06/10/2025 78 >=50 mg/dL Final     Triglycerides   Date Value Ref Range Status   06/10/2025 102 See Comment mg/dL Final     Comment:     Triglyceride:     0-9Y            <75mg/dL     10Y-17Y         <90 mg/dL       >=18Y     Normal          <150 mg/dL     Borderline High 150-199 mg/dL     High            200-499 mg/dL        Very High       >499 mg/dL    Specimen collection should occur prior to Metamizole administration due to the potential for falsely depressed results.      ALT   Date Value Ref Range Status   06/10/2025 15 7 - 52 U/L Final     Comment:     Specimen collection should occur prior to Sulfasalazine administration due to the potential for falsely depressed results.      AST   Date Value Ref Range Status   06/10/2025 17 13 - 39 U/L Final     Alkaline Phosphatase   Date Value Ref Range Status   06/10/2025 35 34 - 104 U/L Final      Lab Results   Component Value Date    MMG7SUDMEAPZ 3.306 06/10/2025       Lab Results   Component Value Date    FREET4 0.88 12/10/2024      Lab Results   Component Value Date    OAER39LIGWFC 54.8 06/10/2025    CIHR63EZFLWM 57.5 12/10/2024    ZPLO08VSKTWX 59.4 11/09/2023            tudy Result    Narrative & Impression   CENTRAL  DXA SCAN     CLINICAL HISTORY:   73year old post-menopausal  female risk factors include past study noted osteoporosis, spine, femoral neck, and forearm areas, 2017. Additional medical history: Treatment with Prolia 2019 to present time.     TECHNIQUE: Bone densitometry was performed using a Hologic Horizon W bone densitometer.  Regions of interest appear properly placed. There are   other confounding variables which could limit the study.     Degenerative or osteoarthritic changes are noted on the spine image along with scoliosis and L1 and L4 are eliminated from evaluation but those changes probably affect the remaining vertebrae as well.     COMPARISON: Follow-up     RESULTS:  LUMBAR SPINE: L2-L3:  BMD 0.844 gm/cm2  T-score -1.9 and 6% higher than 2021 and 11% higher than 2017, statistically significant change.  Z-score 0.5     LEFT TOTAL HIP:  BMD 0.659 gm/cm2  T-score -2.3  Z-score -0.6     LEFT FEMORAL NECK:  BMD 0.615 gm/cm2  T-score -2.1 and 10% higher than 2021 and 13% higher than 2017.  Statistically significant change.  Z-score -0.1     The left forearm BMD is 0.548 and the T score is -2.4 and unchanged from 2021 and 2% higher than 2017. Z score is 0.0.     Continuation of current treatment would seem appropriate at this time.     IMPRESSION:  1. Based on the WHO classification, this study identifies a diagnosis of low bone mass, notable at the spine, total hip, femoral neck, and forearm areas which have improved with treatment from the diagnosis of osteoporosis and the patient remains at    risk for fracture.     2. A daily intake of calcium of at least 1200 mg and vitamin D, 800-1000 IU, as well as weight bearing and muscle strengthening  exercise, fall prevention and avoidance of tobacco and excessive alcohol intake as basic preventive measures are recommended.     3. Repeat DXA scan on the same equipment in 18-24 months as clinically indicated.         Study Result    Narrative & Impression   THYROID ULTRASOUND     INDICATION:    E04.1: Nontoxic single thyroid nodule.     COMPARISON: 6/12/2022     TECHNIQUE:   Ultrasound of the thyroid was performed with a high frequency linear transducer in transverse and sagittal planes including volumetric imaging sweeps as well as traditional still imaging technique.     FINDINGS:  Normal homogeneous smooth echotexture.     Right lobe: 3.9 x 1.6 x 1.7 cm. Volume 4.8 mL  Left lobe:  4.0 x 1.7 x 1.4 cm. Volume 4.6 mL  Isthmus: 0.4  cm.     Nodule #1.  Image 30.  RIGHT lower pole/isthmus nodule measuring 2.6 x 1.7 x 3 cm.  Given differences in measuring technique, no significant change from prior.  COMPOSITION:  0 points, cystic or almost completely cystic.  ECHOGENICITY:  0 points, anechoic.  SHAPE:  0 points, wider-than-tall.  MARGIN: 0 points, smooth.  ECHOGENIC FOCI:  0 points, none or large comet-tail artifacts.  TI-RADS Classification: TR 1 (0 points), Benign. No FNA.     Nodule #2.  Image 41.  LEFT midgland nodule measuring 1.8 x 1 x 1.3 cm.  Given differences in measuring technique, no significant change from prior.  COMPOSITION:  0 points, spongiform.  ECHOGENICITY:  Not applicable when spongiform composition.  SHAPE:  Not applicable when spongiform composition.  MARGIN: Not applicable when spongiform composition.  ECHOGENIC FOCI:  Not applicable when spongiform composition.  TI-RADS Classification: TR 1 (0 points), Benign. No FNA.           There are additional nodules of lesser size and/or TI-RADS score.  These do not necessitate additional evaluation based on ACR criteria.     IMPRESSION:     No nodule meets current ACR criteria for requiring biopsy or followup ultrasounds.              There are no  Patient Instructions on file for this visit.    Discussed with the patient and all questioned fully answered. She will call me if any problems arise.           [1]   Current Outpatient Medications on File Prior to Visit   Medication Sig Dispense Refill    b complex vitamins capsule Take 1 capsule by mouth in the morning.      Calcium Carbonate (CALCIUM 600 PO) Take 700 mg by mouth in the morning and 700 mg in the evening.      cholecalciferol (VITAMIN D3) 1,000 units tablet Take 3 tablets (3,000 Units total) by mouth daily      esomeprazole (NexIUM) 40 MG capsule Take 1 capsule by mouth in the morning.      ibuprofen (MOTRIN) 200 mg tablet Take 200 mg by mouth every 6 (six) hours as needed for mild pain      Multiple Vitamins-Minerals (Centrum Silver 50+Women) TABS Take by mouth      NON FORMULARY Take 1 capsule by mouth in the morning CBD OIL CAPSULES      NON FORMULARY Take 2 capsules by mouth in the morning Core Immune Zinc-Vitamin C      Omega-3 Fatty Acids (OMEGA-3 CF) 1000 MG CAPS Take 1 capsule by mouth in the morning and 1 capsule before bedtime.      Probiotic Product (PROBIOTIC DAILY PO) Take by mouth      Red Yeast Rice Extract (RED YEAST RICE PO) Take 2 capsules by mouth in the morning      Turmeric (CURCUMIN 95 PO) Take 400 mg by mouth in the morning.       Current Facility-Administered Medications on File Prior to Visit   Medication Dose Route Frequency Provider Last Rate Last Admin    denosumab (PROLIA) subcutaneous injection 60 mg  60 mg Subcutaneous Q6 Months Robin Ramos MD   60 mg at 12/20/24 1116   [2]   Social History  Tobacco Use    Smoking status: Never    Smokeless tobacco: Never   Vaping Use    Vaping status: Never Used   Substance and Sexual Activity    Alcohol use: No    Drug use: No    Sexual activity: Yes     Partners: Male     Birth control/protection: None     Comment: Been through menopause

## 2025-07-02 NOTE — ASSESSMENT & PLAN NOTE
Advised to cut back on calcium supplementation as she seems to be getting at least 2 servings of dairy in her diet-continue vitamin D supplementation.  Fall prevention.  She will receive Prolia today, repeat DEXA prior to next visit  Orders:    DXA bone density spine hip and pelvis; Future    Basic metabolic panel; Future

## 2025-07-02 NOTE — PROGRESS NOTES
"Assessment/Plan:    Ema Claudio came into the [facility] today 07/02/25 to receive prolia injection.      Verbal consent obtained.  Consent given by: patient    patient states patient has been medically healthy with no underlining concerns/complications.      Ema Claudio presents with no symptoms today.       All insturctions were reviewed with the patient.    If the patient should have any questions/concerns, advised patient to contacted [facility].       Subjective:     History provided by: patient    Patient ID: Ema Claudio is a 75 y.o. female      Objective:    Vitals:    07/02/25 1154   BP: 110/70   Pulse: 72   SpO2: 98%   Weight: 61.2 kg (135 lb)   Height: 5' 2\" (1.575 m)       Patient tolerated the injection well without any complications.  Injection site/s left arm.  Medication was provided by office.    Patient signed consent form yes   Patient signed ABN form yes (If no patient is not a medicare patient).   Patient waited 15 minutes after injection no (This only applies to patient's receiving first time injection).       Last Visit: Visit date not found  Next visit:Visit date not found      "

## 2025-08-01 ENCOUNTER — TELEPHONE (OUTPATIENT)
Dept: ADMINISTRATIVE | Facility: OTHER | Age: 75
End: 2025-08-01